# Patient Record
Sex: FEMALE | Race: WHITE | NOT HISPANIC OR LATINO | Employment: UNEMPLOYED | URBAN - METROPOLITAN AREA
[De-identification: names, ages, dates, MRNs, and addresses within clinical notes are randomized per-mention and may not be internally consistent; named-entity substitution may affect disease eponyms.]

---

## 2018-11-13 ENCOUNTER — OFFICE VISIT CONVERTED (OUTPATIENT)
Dept: ORTHOPEDIC SURGERY | Facility: CLINIC | Age: 18
End: 2018-11-13
Attending: ORTHOPAEDIC SURGERY

## 2018-12-11 ENCOUNTER — OFFICE VISIT CONVERTED (OUTPATIENT)
Dept: ORTHOPEDIC SURGERY | Facility: CLINIC | Age: 18
End: 2018-12-11
Attending: PHYSICIAN ASSISTANT

## 2019-01-03 ENCOUNTER — OFFICE VISIT CONVERTED (OUTPATIENT)
Dept: ORTHOPEDIC SURGERY | Facility: CLINIC | Age: 19
End: 2019-01-03
Attending: PHYSICIAN ASSISTANT

## 2019-02-05 ENCOUNTER — OFFICE VISIT CONVERTED (OUTPATIENT)
Dept: ORTHOPEDIC SURGERY | Facility: CLINIC | Age: 19
End: 2019-02-05
Attending: PHYSICIAN ASSISTANT

## 2020-02-17 ENCOUNTER — OFFICE VISIT CONVERTED (OUTPATIENT)
Dept: FAMILY MEDICINE CLINIC | Facility: CLINIC | Age: 20
End: 2020-02-17
Attending: NURSE PRACTITIONER

## 2020-02-17 ENCOUNTER — CONVERSION ENCOUNTER (OUTPATIENT)
Dept: FAMILY MEDICINE CLINIC | Facility: CLINIC | Age: 20
End: 2020-02-17

## 2020-03-13 ENCOUNTER — HOSPITAL ENCOUNTER (OUTPATIENT)
Dept: CARDIOLOGY | Facility: HOSPITAL | Age: 20
Discharge: HOME | End: 2020-03-13
Attending: NURSE PRACTITIONER

## 2020-03-16 ENCOUNTER — OFFICE VISIT CONVERTED (OUTPATIENT)
Dept: FAMILY MEDICINE CLINIC | Facility: CLINIC | Age: 20
End: 2020-03-16
Attending: NURSE PRACTITIONER

## 2020-05-01 ENCOUNTER — OFFICE VISIT CONVERTED (OUTPATIENT)
Dept: FAMILY MEDICINE CLINIC | Facility: CLINIC | Age: 20
End: 2020-05-01
Attending: PHYSICIAN ASSISTANT

## 2020-06-16 ENCOUNTER — OFFICE VISIT CONVERTED (OUTPATIENT)
Dept: FAMILY MEDICINE CLINIC | Facility: CLINIC | Age: 20
End: 2020-06-16
Attending: NURSE PRACTITIONER

## 2020-06-16 ENCOUNTER — CONVERSION ENCOUNTER (OUTPATIENT)
Dept: FAMILY MEDICINE CLINIC | Facility: CLINIC | Age: 20
End: 2020-06-16

## 2020-06-23 ENCOUNTER — HOSPITAL ENCOUNTER (OUTPATIENT)
Dept: LAB | Facility: HOSPITAL | Age: 20
Discharge: HOME | End: 2020-06-23
Attending: INTERNAL MEDICINE

## 2020-06-23 LAB
ANION GAP SERPL CALC-SCNC: 15 MMOL/L (ref 8–19)
BASOPHILS # BLD AUTO: 0.09 10*3/UL (ref 0–0.2)
BASOPHILS NFR BLD AUTO: 0.7 % (ref 0–3)
BUN SERPL-MCNC: 7 MG/DL (ref 5–25)
BUN/CREAT SERPL: 11 {RATIO} (ref 6–20)
CALCIUM SERPL-MCNC: 9.2 MG/DL (ref 8.7–10.4)
CHLORIDE SERPL-SCNC: 102 MMOL/L (ref 99–111)
CONV ABS IMM GRAN: 0.18 10*3/UL (ref 0–0.2)
CONV CO2: 25 MMOL/L (ref 22–32)
CONV IMMATURE GRAN: 1.5 % (ref 0–1.8)
CREAT UR-MCNC: 0.63 MG/DL (ref 0.5–0.9)
DEPRECATED RDW RBC AUTO: 43.6 FL (ref 36.4–46.3)
EOSINOPHIL # BLD AUTO: 0.13 10*3/UL (ref 0–0.7)
EOSINOPHIL # BLD AUTO: 1.1 % (ref 0–7)
ERYTHROCYTE [DISTWIDTH] IN BLOOD BY AUTOMATED COUNT: 13.2 % (ref 11.7–14.4)
GFR SERPLBLD BASED ON 1.73 SQ M-ARVRAT: >60 ML/MIN/{1.73_M2}
GLUCOSE SERPL-MCNC: 79 MG/DL (ref 65–99)
HCT VFR BLD AUTO: 48.4 % (ref 37–47)
HGB BLD-MCNC: 15.5 G/DL (ref 12–16)
LYMPHOCYTES # BLD AUTO: 2.59 10*3/UL (ref 1–5)
LYMPHOCYTES NFR BLD AUTO: 21.2 % (ref 20–45)
MCH RBC QN AUTO: 29 PG (ref 27–31)
MCHC RBC AUTO-ENTMCNC: 32 G/DL (ref 33–37)
MCV RBC AUTO: 90.5 FL (ref 81–99)
MONOCYTES # BLD AUTO: 0.78 10*3/UL (ref 0.2–1.2)
MONOCYTES NFR BLD AUTO: 6.4 % (ref 3–10)
NEUTROPHILS # BLD AUTO: 8.45 10*3/UL (ref 2–8)
NEUTROPHILS NFR BLD AUTO: 69.1 % (ref 30–85)
NRBC CBCN: 0 % (ref 0–0.7)
OSMOLALITY SERPL CALC.SUM OF ELEC: 283 MOSM/KG (ref 273–304)
PLATELET # BLD AUTO: 349 10*3/UL (ref 130–400)
PMV BLD AUTO: 9.7 FL (ref 9.4–12.3)
POTASSIUM SERPL-SCNC: 4.3 MMOL/L (ref 3.5–5.3)
RBC # BLD AUTO: 5.35 10*6/UL (ref 4.2–5.4)
SODIUM SERPL-SCNC: 138 MMOL/L (ref 135–147)
T4 FREE SERPL-MCNC: 1.3 NG/DL (ref 0.9–1.8)
TSH SERPL-ACNC: 3.27 M[IU]/L (ref 0.27–4.2)
WBC # BLD AUTO: 12.22 10*3/UL (ref 4.8–10.8)

## 2020-06-24 LAB — IGE SERPL-ACNC: 297 K[IU]/ML (ref 0–24)

## 2020-07-15 ENCOUNTER — OFFICE VISIT CONVERTED (OUTPATIENT)
Dept: PODIATRY | Facility: CLINIC | Age: 20
End: 2020-07-15
Attending: PODIATRIST

## 2020-07-29 ENCOUNTER — CONVERSION ENCOUNTER (OUTPATIENT)
Dept: PODIATRY | Facility: CLINIC | Age: 20
End: 2020-07-29

## 2020-07-29 ENCOUNTER — PROCEDURE VISIT CONVERTED (OUTPATIENT)
Dept: PODIATRY | Facility: CLINIC | Age: 20
End: 2020-07-29
Attending: PODIATRIST

## 2021-04-20 ENCOUNTER — OFFICE VISIT CONVERTED (OUTPATIENT)
Dept: FAMILY MEDICINE CLINIC | Facility: CLINIC | Age: 21
End: 2021-04-20
Attending: NURSE PRACTITIONER

## 2021-05-10 NOTE — H&P
History and Physical      Patient Name: Sylvia Oliva   Patient ID: [id number]   Sex: Female   YOB: 2000    Primary Care Provider: Christal AGUILAR   Referring Provider: Christal AGUILAR    Visit Date: July 15, 2020    Provider: Derrick Bird DPM   Location: [facility]   Location Address: [address] [place] [address]  [id number]   Location Phone: [phone]          Chief Complaint  · Left Foot Pain  · Plantar wart      History Of Present Illness  Sylvia Oliva presents to the office today for evaluation and treatment of      New, Established, New Problem: New  Location: Plantar left forefoot  Duration: 2000  Onset: Insidious  Nature: Sore  Stable, worsening, improving: Worsening  Aggravating factors:  Patient relates pain is aggravated by shoe gear and ambulation.    Previous Treatment: None  Patient denies any fevers, chills, nausea, vomiting, shortness of breathe, nor any other constitutional signs nor symptoms.    Referred from JEFF Moody.       Past Medical History  Asthma; Bone Density Screening; Depression; Plantar wart; Seasonal allergies         Past Surgical History  *No Past Surgical History         Medication List  albuterol sulfate 90 mcg/actuation inhalation HFA aerosol inhaler; Breo Ellipta 200-25 mcg/dose inhalation blister with device; Flonase Allergy Relief 50 mcg/actuation nasal spray,suspension; Prozac 10 mg oral capsule; Singulair 10 mg oral tablet; Spiriva Respimat 1.25 mcg/actuation inhalation mist; Zyrtec 10 mg oral tablet         Allergy List  NO KNOWN DRUG ALLERGIES       Allergies Reconciled  Family Medical History  Hypertension; Renal Cancer         Reproductive History   0 Para 0 0 0 0       Social History  Active but no formal exercise; Alcohol Use (Never); lives with other; lives with parents; No known infection risk; Recreational Drug Use (Never); Single.; Student.; Tobacco (Never); Working  "        Review of Systems  · Constitutional  o Denies  o : fatigue, night sweats  · Eyes  o Denies  o : double vision, blurred vision  · HENT  o Denies  o : vertigo, recent head injury  · Cardiovascular  o Denies  o : chest pain, irregular heart beats  · Respiratory  o Denies  o : shortness of breath, productive cough  · Gastrointestinal  o Denies  o : nausea, vomiting  · Genitourinary  o Denies  o : dysuria, urinary retention  · Integument  o * See HPI  · Neurologic  o Denies  o : altered mental status, seizures  · Musculoskeletal  o Denies  o : joint swelling, limitation of motion  · Endocrine  o Denies  o : cold intolerance, heat intolerance  · Heme-Lymph  o Denies  o : petechiae, lymph node enlargement or tenderness  · Allergic-Immunologic  o Denies  o : frequent illnesses      Vitals  Date Time BP Position Site L\R Cuff Size HR RR TEMP (F) WT  HT  BMI kg/m2 BSA m2 O2 Sat HC       07/15/2020 08:51 /62 Sitting    90 - R  97.5 250lbs 6oz 5'  5\" 41.66 2.28 99 %          Physical Examination  · Constitutional  o Appearance  o : awake, alert, well developed, well nourished, and well groomed  · Cardiovascular  o Peripheral Vascular System  o :   § Pedal Pulses  § : 2+ and symmetrical   · Musculoskeletal  o Extremeties/Joint  o : Lower extremity muscle strength and range of motion is equal and symmetrical bilaterally. The knees are noted to be in normal alignment. Ankle alignment and range of motion is normal and foot structure is normal. Subtalar, metatarsal and metatarsal-phalangeal joint range of motion is noted to be within normal limits. The digits of both feet are in normal alignment. The gait is normal.   · Skin and Subcutaneous Tissue  o Extremities  o :   § Left Lower Extremity  § : localized verruca on plantar left forefoot.  · Neurologic  o Sensation  o : Epicritic sensation intact bilaterally  · Procedures  o Destruct Verruca  o : This patient present for a destruction of verrucae lesion of the " plantar foot. I have recommended chemical destruction as therapy, of 1 lesions. Indications, risks and benefits and alternative treatments have been discussed with this patient who has agreed to this procedure. The lesion(s) was pared down and 89% Phenol was applied to the lesion area followed by irrigation with isopropyl alcohol.           Assessment  · Foot pain, left     729.5/M79.672  · Plantar wart     078.12/B07.0      Plan  · Orders  o Destruction of 1 to 14 benign lesions (43231) - - 07/15/2020  · Medications  o Medications have been Reconciled  o Transition of Care or Provider Policy  · Instructions  o Follow-up in 2 weeks  o I have discussed the findings of this evaluation with the patient. The discussion included a complete verbal explanation of any changes in the examination results, diagnosis, and the current treatment plan. A schedule for future care needs was explained. If any questions should arise after returning home, I have encouraged the patient to feel free to contact Dr. Bird. The patient states understanding and agreement with this plan.   o Pt to monitor for problems and to contact Dr. Bird for follow-up should such signs occur. Patient states understanding and agreement with this plan.   o Patient instructed to apply over the counter salicylic pads to the lesion(s) area(s) at bedtime and remove during the day. The patient states understanding and agreement with this plan.   o Electronically Identified Patient Education Materials Provided Electronically  · Disposition  o Call or Return if symptoms worsen or persist.            Electronically Signed by: Derrick Bird DPM -Author on July 15, 2020 09:10:49 AM

## 2021-05-13 NOTE — PROGRESS NOTES
Progress Note      Patient Name: Sylvia Oliva   Patient ID: [id number]   Sex: Female   YOB: 2000    Primary Care Provider: Christal AGUILAR   Referring Provider: Christal AGUILAR    Visit Date: June 16, 2020    Provider: JEFF Hernandez   Location: [facility]   Location Address: [address] [place] [address], [address]  [place], [place]  [id number]   Location Phone: [phone]          Chief Complaint  · Pt here for 3 month f/u   · medication refills  · FMLA      History Of Present Illness  Sylvia Oliva is a 20 year old /White female who presents for evaluation and treatment of:      Depression, anxiety, asthma, wart on left foot    Anxiety/ Depression-taking Prozac 10mg daily and doing well. Has been trying to be more compliant on taking it. Needs refill.     Asthma-Seen pulmonology and is now on Spiriva and breo inhalers and doing well. Rarely has to use rescue inhaler.     Left foot wart-c/o wart on left foot; becoming increasingly painful. pt has tried several OTC treatment remedies without relief.     Pt here also needing FMLA paperwork filled out for return to work. She started back 6/14.       Past Medical History  Disease Name Date Onset Notes   Asthma --  --    Bone Density Screening 12/2018 normal   Depression --  --    Seasonal allergies --  --          Past Surgical History  Procedure Name Date Notes   *No Past Surgical History --  --          Medication List  Name Date Started Instructions   albuterol sulfate 90 mcg/actuation inhalation HFA aerosol inhaler  inhale 1 - 2 puffs (90 - 180 mcg) by inhalation route every 4-6 hours as needed   Breo Ellipta 200-25 mcg/dose inhalation blister with device  inhale 1 puff by inhalation route once daily at the same time each day   Flonase Allergy Relief 50 mcg/actuation nasal spray,suspension  spray 1 - 2 sprays (50 - 100 mcg) in each nostril by intranasal route once daily as needed   Prozac 10 mg oral capsule 06/16/2020 take 1 capsule (10  mg) by oral route once daily for 90 days   Singulair 10 mg oral tablet 2020 take 1 tablet (10 mg) by oral route once daily in the evening for 30 days   Spiriva Respimat 1.25 mcg/actuation inhalation mist  inhale 2 puffs (2.5 mcg) by inhalation route once daily for 30 days   Zyrtec 10 mg oral tablet 2020 take 1 tablet (10 mg) by oral route once daily for 90 days         Allergy List  Allergen Name Date Reaction Notes   NO KNOWN DRUG ALLERGIES --  --  --        Allergies Reconciled  Family Medical History  Disease Name Relative/Age Notes   Hypertension Grandfather (maternal)/  Mother/   --    Renal Cancer Grandfather (maternal)/   --          Reproductive History  Menstrual   Age Menarche: 10   Pregnancy Summary   Total Pregnancies: 0 Full Term: 0 Premature: 0   Ab Induced: 0 Ab Spontaneous: 0 Ectopics: 0   Multiples: 0 Livin         Social History  Finding Status Start/Stop Quantity Notes   Active but no formal exercise --  --/-- --  --    Alcohol Use Never --/-- --  does not drink   lives with other --  --/-- --  --    lives with parents --  --/-- --  --    No known infection risk --  --/-- --  --    Recreational Drug Use Never --/-- --  no   Single. --  --/-- --  --    Student. --  --/-- --  --    Tobacco Never --/-- --  never smoker   Working --  --/-- --  --          Review of Systems  · Constitutional  o Denies  o : fatigue, night sweats  · Eyes  o Denies  o : double vision, blurred vision  · HENT  o Denies  o : vertigo, recent head injury  · Breasts  o Denies  o : abnormal changes in breast size, additional breast symptoms except as noted in the HPI  · Cardiovascular  o Denies  o : chest pain, irregular heart beats  · Respiratory  o Denies  o : shortness of breath, productive cough  · Gastrointestinal  o Denies  o : nausea, vomiting  · Genitourinary  o Denies  o : dysuria, urinary retention  · Integument  o Admits  o : wart on left foot  o Denies  o : hair growth change, new skin  "lesions  · Neurologic  o Denies  o : altered mental status, seizures  · Musculoskeletal  o Denies  o : joint swelling, limitation of motion  · Endocrine  o Denies  o : cold intolerance, heat intolerance  · Heme-Lymph  o Denies  o : petechiae, lymph node enlargement or tenderness  · Allergic-Immunologic  o Denies  o : frequent illnesses      Vitals  Date Time BP Position Site L\R Cuff Size HR RR TEMP (F) WT  HT  BMI kg/m2 BSA m2 O2 Sat HC       06/16/2020 01:02 /72 Sitting    87 - R  97.8 243lbs 16oz 5'  1\" 46.1 2.18 97 %          Physical Examination  · Constitutional  o Appearance  o : well developed, well-nourished, no acute distress  · Head and Face  o Head  o : normocephalic, atraumatic  · Neck  o Inspection/Palpation  o : normal appearance, no masses or tenderness, trachea midline  o Thyroid  o : gland size normal, nontender, no nodules or masses present on palpation  · Respiratory  o Respiratory Effort  o : breathing unlabored  o Inspection of Chest  o : chest rise symmetric bilaterally  o Auscultation of Lungs  o : clear to auscultation bilaterally throughout inspiration and expiration  · Cardiovascular  o Heart  o :   § Auscultation of Heart  § : regular rate and rhythm, no murmurs, gallops or rubs  o Peripheral Vascular System  o :   § Extremities  § : no edema  · Lymphatic  o Neck  o : no cervical lymphadenopathy, no supraclavicular lymphadenopathy  · Psychiatric  o Mood and Affect  o : mood normal, affect appropriate     integumentary-wart on plantar surface of left foot below third and fourth toes. TTP           Assessment  · Asthma     493.90/J45.909  · Depression     311/F32.9  · Plantar wart, left foot     078.12/B07.0    Problems Reconciled  Plan  · Orders  o ACO-39: Current medications updated and reviewed () - - 06/16/2020  o PODIATRY CONSULTATION (PODIA) - 078.12/B07.0 - 06/16/2020   dr holbrook-alannaht on left foot  · Medications  o Prozac 10 mg oral capsule   SIG: take 1 capsule (10 mg) " by oral route once daily for 90 days   DISP: (90) capsule with 1 refills  Prescribed on 06/16/2020     o Medications have been Reconciled  o Transition of Care or Provider Policy  · Instructions  o Patient was educated/instructed on their diagnosis, treatment and medications prior to discharge from the clinic today.  o Call the office with any concerns or questions.  o return to work paperwork completed  · Disposition  o Follow Up in 6 months            Electronically Signed by: JEFF Hernandez -Author on June 16, 2020 03:31:18 PM

## 2021-05-13 NOTE — PROGRESS NOTES
Progress Note      Patient Name: Sylvia Oliva   Patient ID: [id number]   Sex: Female   YOB: 2000    Primary Care Provider: Christal AGUILAR   Referring Provider: Christal AGUILAR    Visit Date: May 1, 2020    Provider: JALEESA Perez   Location: [facility]   Location Address: [address] [place] [address], [address]  [place], [place]  [id number]   Location Phone: [phone]          Chief Complaint  · wheezing  · cough       History Of Present Illness  Sylvia Oliva is a 20 year old /White female who presents for evaluation and treatment of:      Asthma: Patient states she was diagnosed with asthma in February and put on Symbicort. She was doing well and her symptoms were well controlled until about 4 days ago when she ran out of the Symbicort and she has had difficulty getting it refilled at the pharmacy and she has been without for 4 days. Patient states she has been wheezing with a productive cough all week & has been getting worse. Patient states she has had SOA, any activity makes it worse & she has been using albuterol inhaler every 4hrs. She does have DuoNeb at home but has not been using it. She does have allergies as well and is taking Zyrtec. She denies fever, chills, nausea, vomiting. She states minimal nasal congestion. Her O2 was rechecked upon sitting and was 99% on room air.       Past Medical History  Disease Name Date Onset Notes   Asthma --  --    Bone Density Screening 12/2018 normal   Depression --  --    Seasonal allergies --  --          Past Surgical History  Procedure Name Date Notes   *No Past Surgical History --  --          Medication List  Name Date Started Instructions   albuterol sulfate 90 mcg/actuation inhalation HFA aerosol inhaler  inhale 1 - 2 puffs (90 - 180 mcg) by inhalation route every 4-6 hours as needed   Prozac 10 mg oral capsule  take 1 capsule (10 mg) by oral route once daily   Symbicort 80-4.5 mcg/actuation inhalation HFA aerosol inhaler  "2020 INHALE TWO PUFFS BY MOUTH TWICE A DAY IN THE MORNING AND EVENING   Zyrtec 10 mg oral tablet 2020 take 1 tablet (10 mg) by oral route once daily for 90 days         Allergy List  Allergen Name Date Reaction Notes   NO KNOWN DRUG ALLERGIES --  --  --          Family Medical History  Disease Name Relative/Age Notes   Hypertension Grandfather (maternal)/  Mother/   --    Renal cancer Grandfather (maternal)/   --          Reproductive History  Menstrual   Age Menarche: 10   Pregnancy Summary   Total Pregnancies: 0 Full Term: 0 Premature: 0   Ab Induced: 0 Ab Spontaneous: 0 Ectopics: 0   Multiples: 0 Livin         Social History  Finding Status Start/Stop Quantity Notes   Active but no formal exercise --  --/-- --  --    Alcohol Use Never --/-- --  does not drink   lives with other --  --/-- --  --    lives with parents --  --/-- --  --    No known infection risk --  --/-- --  --    Recreational Drug Use Never --/-- --  no   Single. --  --/-- --  --    Student. --  --/-- --  --    Tobacco Never --/-- --  never smoker   Working --  --/-- --  --          Review of Systems  · Constitutional  o Denies  o : fever, fatigue, weight loss, weight gain  · HENT  o Admits  o : nasal congestion  o Denies  o : headaches, sinus pain, ear pain  · Cardiovascular  o Denies  o : lower extremity edema, claudication, chest pressure, palpitations  · Respiratory  o Admits  o : shortness of breath, wheezing, cough, dyspnea on exertion  o Denies  o : hemoptysis  · Gastrointestinal  o Denies  o : nausea, vomiting, diarrhea, constipation, abdominal pain  · Heme-Lymph  o Denies  o : petechiae, lymph node enlargement or tenderness      Vitals  Date Time BP Position Site L\R Cuff Size HR RR TEMP (F) WT  HT  BMI kg/m2 BSA m2 O2 Sat        2020 09:15 /68 Sitting    119 - R  97.7 241lbs 8oz 5'  5\" 40.19 2.24 95 %    2020 09:52 AM             99 %          Physical Examination  · Constitutional  o Appearance  o : " well developed, well-nourished, no acute distress  · Head and Face  o Head  o : normocephalic, atraumatic  · Ears, Nose, Mouth and Throat  o Ears  o :   § External Ears  § : external auditory canal appearance normal, no discharge present  § Otoscopic Examination  § : tympanic membranes pearly white/gray bilaterally  o Nose  o :   § External Nose  § : no lesions noted  § Nasopharynx  § : inflammation present   o Oral Cavity  o :   § Oral Mucosa  § : oral mucosa light pink  o Throat  o :   § Oropharynx  § : tonsils without exudate, no palatal petechiae  · Neck  o Inspection/Palpation  o : normal appearance, no masses or tenderness, trachea midline  o Thyroid  o : gland size normal, nontender, no nodules or masses present on palpation  · Respiratory  o Respiratory Effort  o : breathing unlabored  o Inspection of Chest  o : chest rise symmetric bilaterally  o Auscultation of Lungs  o : inspiratory and expiratory wheezing without crackles or rhonchi  · Cardiovascular  o Heart  o :   § Auscultation of Heart  § : regular rate and rhythm, no murmurs, gallops or rubs  o Peripheral Vascular System  o :   § Extremities  § : no edema  · Lymphatic  o Neck  o : no cervical lymphadenopathy, no supraclavicular lymphadenopathy  · Psychiatric  o Mood and Affect  o : mood normal, affect appropriate          Assessment  · Allergic rhinitis due to allergen     477.9/J30.9  Continue with Zyrtec 10mg qd  · Cough     786.2/R05  · Wheezing     786.07/R06.2  · Asthma exacerbation     493.92/J45.901  Gave Kenalog 40mg IM in office. Gave savings card to take to [facility] to attempt to get Symbicort refilled. Erx Singulair 10mg qd to start. Suggested pulse ox for home use. Use neb q 4-6 hrs as needed. Consider CXR and CBC if no improvement. [facility] if sx worsen.    Problems Reconciled  Plan  · Orders  o IM/SQ - Injection Fee [facility] (12534) - - 05/01/2020  o ACO-39: Current medications updated and  reviewed () - - 05/01/2020  o ACO-14: Influenza immunization administered or previously received () - - 05/01/2020  o 4.00 - Kenalog Injection 40mg (-9) - 493.92/J45.901 - 05/01/2020   Injection - Kenalog 40 mg; Dose: 40 mg; Site: Left Gluteus; Route: intramuscular; Date: 05/01/2020 10:21:19; Exp: 04/30/2021; Lot: [id number]; Mfg: [employer]; TradeName: triamcinolone; Location: [facility]; Administered By: Annika Patterson MA; Comment: 40mg/ml 1ml given DRM  · Medications  o Singulair 10 mg oral tablet   SIG: take 1 tablet (10 mg) by oral route once daily in the evening for 30 days   DISP: (30) tablets with 2 refills  Prescribed on 05/01/2020     o Medications have been Reconciled  o Transition of Care or Provider Policy  · Instructions  o Take all medications as prescribed/directed.  o Patient was educated/instructed on their diagnosis, treatment and medications prior to discharge from the clinic today.  o Patient instructed to seek medical attention urgently for new or worsening symptoms.  o Call the office with any concerns or questions.  o Bring all medicines with their bottles to each office visit.  o Electronically Identified Patient Education Materials Provided Electronically  · Disposition  o Call or Return if symptoms worsen or persist.  o Follow Up PRN.            Electronically Signed by: JALEESA Perez -Author on May 1, 2020 01:47:22 PM

## 2021-05-13 NOTE — PROGRESS NOTES
Progress Note      Patient Name: Sylvia Oliva   Patient ID: [id number]   Sex: Female   YOB: 2000    Primary Care Provider: Christal AGUILAR   Referring Provider: Christal AGUILAR    Visit Date: 2020    Provider: Derrick Bird DPM   Location: [facility]   Location Address: [address] [place] [address]  [id number]   Location Phone: [phone]          Chief Complaint  · Left Foot Pain  · Plantar wart      History Of Present Illness  Sylvia Oliva presents to the office today for evaluation and treatment of      New, Established, New Problem: est  Location: Plantar left forefoot  Duration: 2000  Onset: Insidious  Nature: Sore  Stable, worsening, improving: improving  Aggravating factors:  Patient relates pain is aggravated by shoe gear and ambulation.    Previous Treatment: debridement, OTC sal-acid application    Patient denies any fevers, chills, nausea, vomiting, shortness of breathe, nor any other constitutional signs nor symptoms.         Past Medical History  Asthma; Bone Density Screening; Depression; Plantar wart; Seasonal allergies         Past Surgical History  *No Past Surgical History         Medication List  albuterol sulfate 90 mcg/actuation inhalation HFA aerosol inhaler; Breo Ellipta 200-25 mcg/dose inhalation blister with device; Flonase Allergy Relief 50 mcg/actuation nasal spray,suspension; Prozac 10 mg oral capsule; Singulair 10 mg oral tablet; Spiriva Respimat 1.25 mcg/actuation inhalation mist; Zyrtec 10 mg oral tablet         Allergy List  NO KNOWN DRUG ALLERGIES       Allergies Reconciled  Family Medical History  Hypertension; Renal Cancer         Reproductive History   0 Para 0 0 0 0       Social History  Active but no formal exercise; Alcohol Use (Never); lives with other; lives with parents; No known infection risk; Recreational Drug Use (Never); Single.; Student.; Tobacco (Never); Working         Review of  "Systems  · Constitutional  o Denies  o : fatigue, night sweats  · Eyes  o Denies  o : double vision, blurred vision  · HENT  o Denies  o : vertigo, recent head injury  · Cardiovascular  o Denies  o : chest pain, irregular heart beats  · Respiratory  o Denies  o : shortness of breath, productive cough  · Gastrointestinal  o Denies  o : nausea, vomiting  · Genitourinary  o Denies  o : dysuria, urinary retention  · Integument  o * See HPI  · Neurologic  o Denies  o : altered mental status, seizures  · Musculoskeletal  o Denies  o : joint swelling, limitation of motion  · Endocrine  o Denies  o : cold intolerance, heat intolerance  · Heme-Lymph  o Denies  o : petechiae, lymph node enlargement or tenderness  · Allergic-Immunologic  o Denies  o : frequent illnesses      Vitals  Date Time BP Position Site L\R Cuff Size HR RR TEMP (F) WT  HT  BMI kg/m2 BSA m2 O2 Sat HC       07/29/2020 11:23 /72 Sitting    91 - R  97.8 250lbs 2oz 5'  5\" 41.62 2.28 96 %          Physical Examination  · Constitutional  o Appearance  o : awake, alert, well developed, well nourished, and well groomed  · Cardiovascular  o Peripheral Vascular System  o :   § Pedal Pulses  § : 2+ and symmetrical   · Musculoskeletal  o Extremeties/Joint  o : Lower extremity muscle strength and range of motion is equal and symmetrical bilaterally. The knees are noted to be in normal alignment. Ankle alignment and range of motion is normal and foot structure is normal. Subtalar, metatarsal and metatarsal-phalangeal joint range of motion is noted to be within normal limits. The digits of both feet are in normal alignment. The gait is normal.   · Skin and Subcutaneous Tissue  o Extremities  o :   § Left Lower Extremity  § : localized verruca on plantar left forefoot.  · Neurologic  o Sensation  o : Epicritic sensation intact bilaterally  · Procedures  o Destruct Verruca  o : This patient present for a destruction of verrucae lesion of the plantar foot. I have " recommended chemical destruction as therapy, of 1 lesions. Indications, risks and benefits and alternative treatments have been discussed with this patient who has agreed to this procedure. The lesion(s) was pared down and 89% Phenol was applied to the lesion area followed by irrigation with isopropyl alcohol.           Assessment  · Foot pain, left     729.5/M79.672  · Plantar wart     078.12/B07.0      Plan  · Orders  o Destruction of 1 to 14 benign lesions (05704, 32595) - - 07/29/2020  · Medications  o Medications have been Reconciled  o Transition of Care or Provider Policy  · Instructions  o Follow Up as Needed  o I have discussed the findings of this evaluation with the patient. The discussion included a complete verbal explanation of any changes in the examination results, diagnosis, and the current treatment plan. A schedule for future care needs was explained. If any questions should arise after returning home, I have encouraged the patient to feel free to contact Dr. Bird. The patient states understanding and agreement with this plan.   o Pt to monitor for problems and to contact Dr. Bird for follow-up should such signs occur. Patient states understanding and agreement with this plan.   o Electronically Identified Patient Education Materials Provided Electronically  · Disposition  o Call or Return if symptoms worsen or persist.            Electronically Signed by: Derrick Bird DPM -Author on July 29, 2020 11:56:36 AM

## 2021-05-14 VITALS
TEMPERATURE: 99.5 F | HEART RATE: 82 BPM | WEIGHT: 256 LBS | OXYGEN SATURATION: 99 % | SYSTOLIC BLOOD PRESSURE: 121 MMHG | DIASTOLIC BLOOD PRESSURE: 59 MMHG | BODY MASS INDEX: 42.65 KG/M2 | HEIGHT: 65 IN

## 2021-05-14 NOTE — PROGRESS NOTES
Progress Note      Patient Name: Sylvia Oliva   Patient ID: [id number]   Sex: Female   YOB: 2000    Primary Care Provider: Christal AGUILAR   Referring Provider: Christal AGUILAR    Visit Date: April 20, 2021    Provider: JEFF Hernandez   Location: [facility]   Location Address: [address] [place] [address], [address]  [place], [place]  [id number]   Location Phone: [phone]          Chief Complaint  · Follow up - Anxiety/Depression, Asthma  · Eval for ADHD  · Hypersomnia      History Of Present Illness  Sylvia Oliva is a 20 year old /White female who presents for evaluation and treatment of:      Follow up on Anxiety/Depression, Asthma for medication refills.    Anxiety/Depression:  Takes Prozac.  Patient scored 15 on today's PHQ9.  Patient states she thinks medication is working well for her.  Patient states it is hard to tell d/t she thinks she has ADHD or ADD.  Patient states she has executive disfunction.  She states she knows she has to do things and knows how to these things but cannot physically force herself to do it.  Patient states she is having difficulty focusing, difficulty starting something then once she does she can't stop doing that thing. Pt states she is generally happy, but feels guilt bc she does not always get her schoolwork done or projects started. pt does not think this is depression symptoms, she feels as though this is due to trouble focusing and inattention. She does admit to not taking the Prozac regularly and she does miss doses.     Asthma:  Takes Breo Ellipta, Flonase, Spriiva, Zyrtec, Albuterol HFA PRN with good control of symptoms.  Patient is managed per Dr. Foss, Pulmonology. Pt admits to using the Albuterol inhaler prior to when she has to walk to class uphill.     Patient requesting stimulant Rx for Hypersomnia.  Patient states she previously took Modafinil, but is requesting something else d/t Rx made her feel physically ill.   Patient states her last sleep study was 2016.  Patient states she was diagnosed with borderline Narcolepsy.    Patient has had her first Pfizer Covid 19 vaccination. Pt gets her second vaccine May 1.       Past Medical History  Disease Name Date Onset Notes   Asthma --  --    Bone Density Screening 12/2018 normal   Depression --  --    Plantar wart --  --    Seasonal allergies --  --          Past Surgical History  Procedure Name Date Notes   *No Past Surgical History --  --          Medication List  Name Date Started Instructions   albuterol sulfate 90 mcg/actuation inhalation HFA aerosol inhaler  inhale 1 - 2 puffs (90 - 180 mcg) by inhalation route every 4-6 hours as needed   Breo Ellipta 200-25 mcg/dose inhalation blister with device  inhale 1 puff by inhalation route once daily at the same time each day   Flonase Allergy Relief 50 mcg/actuation nasal spray,suspension  spray 1 - 2 sprays (50 - 100 mcg) in each nostril by intranasal route once daily as needed   Prozac 10 mg oral capsule 04/20/2021 take 1 capsule (10 mg) by oral route once daily for 90 days   Singulair 10 mg oral tablet 05/01/2020 take 1 tablet (10 mg) by oral route once daily in the evening for 30 days   Spiriva Respimat 1.25 mcg/actuation inhalation mist  inhale 2 puffs (2.5 mcg) by inhalation route once daily for 30 days   Vitamin D3 10 mcg (400 unit) oral tablet  take 1 tablet by oral route daily   Zyrtec 10 mg oral tablet 03/16/2020 take 1 tablet (10 mg) by oral route once daily for 90 days         Allergy List  Allergen Name Date Reaction Notes   NO KNOWN DRUG ALLERGIES --  --  --        Allergies Reconciled  Family Medical History  Disease Name Relative/Age Notes   Hypertension Grandfather (maternal)/  Mother/   --    Renal Cancer Grandfather (maternal)/   --          Reproductive History  Menstrual   Age Menarche: 10   Pregnancy Summary   Total Pregnancies: 0 Full Term: 0 Premature: 0   Ab Induced: 0 Ab Spontaneous: 0 Ectopics: 0  "  Multiples: 0 Livin         Social History  Finding Status Start/Stop Quantity Notes   Active but no formal exercise --  --/-- --  --    Alcohol Use Never --/-- --  does not drink   lives with other --  --/-- --  --    lives with parents --  --/-- --  --    No known infection risk --  --/-- --  --    Recreational Drug Use Never --/-- --  no   Single. --  --/-- --  --    Student. --  --/-- --  --    Tobacco Never --/-- --  --    Working --  --/-- --  --          Immunizations  NameDate Admin Mfg Trade Name Lot Number Route Inj VIS Given VIS Publication   COVID Mihsli40/10/2021 NE Not Entered  NE NE 2021    Comments:    Gzkwttznx86/10/2020 [employer] Fluzone Quadrivalent  NE NE 2021    Comments:          Review of Systems  · Constitutional  o Admits  o : inattentiveness  o Denies  o : fatigue  · Eyes  o Denies  o : blurred vision, changes in vision  · HENT  o Denies  o : headaches  · Cardiovascular  o Denies  o : chest pain, irregular heart beats, rapid heart rate, dyspnea on exertion  · Respiratory  o Denies  o : shortness of breath, wheezing, cough  · Gastrointestinal  o Denies  o : nausea, vomiting, diarrhea, constipation, abdominal pain, blood in stools, melena  · Genitourinary  o Denies  o : frequency, dysuria, hematuria  · Integument  o Denies  o : rash, new skin lesions  · Musculoskeletal  o Denies  o : joint pain, joint swelling, muscle pain  · Endocrine  o Denies  o : polyuria, polydipsia      Vitals  Date Time BP Position Site L\R Cuff Size HR RR TEMP (F) WT  HT  BMI kg/m2 BSA m2 O2 Sat FR L/min FiO2 HC       07/15/2020 08:51 /62 Sitting    90 - R  97.5 250lbs 6oz 5'  5\" 41.66 2.28 99 %      2020 11:23 /72 Sitting    91 - R  97.8 250lbs 2oz 5'  5\" 41.62 2.28 96 %      2021 01:16 /59 Sitting    82 - R  99.5 256lbs 0oz 5'  5\" 42.6 2.31 99 %  21%          Physical Examination  · Constitutional  o Appearance  o : well developed, well-nourished, no acute distress  · Head " and Face  o Head  o : normocephalic, atraumatic  · Neck  o Inspection/Palpation  o : normal appearance, no masses or tenderness, trachea midline  o Thyroid  o : gland size normal, nontender, no nodules or masses present on palpation  · Respiratory  o Respiratory Effort  o : breathing unlabored  o Inspection of Chest  o : chest rise symmetric bilaterally  o Auscultation of Lungs  o : clear to auscultation bilaterally throughout inspiration and expiration  · Cardiovascular  o Heart  o :   § Auscultation of Heart  § : regular rate and rhythm, no murmurs, gallops or rubs  o Peripheral Vascular System  o :   § Extremities  § : no edema  · Psychiatric  o Mood and Affect  o : mood normal, affect appropriate          Assessment  · Screening for depression     V79.0/Z13.89  · Anxiety disorder     300.00/F41.9  · Asthma     493.90/J45.909  · Depression     311/F32.9  · Hypersomnia     780.54/G47.10  · Routine lab draw     V72.60/Z01.89  · Decreased attention Span     799.51/R41.840  pt desires formal evaluation for ADD, will refer to psych provider, pt prefers someone in [facility] if possible    Problems Reconciled  Plan  · Orders  o Annual depression screening, 15 minutes (77206, ) - V79.0/Z13.89 - 04/20/2021  o ACO-18: Positive screen for clinical depression using a standardized tool and a follow-up plan documented () - V79.0/Z13.89 - 04/20/2021   15  o Physical, Primary Care Panel (CBC, CMP, Lipid, TSH) [facility] (40357, 96913, 17578, 77630) - V72.60/Z01.89 - 04/20/2021  o ACO-18: Positive screen for clinical depression using a standardized tool and a follow-up plan documented () - - 04/20/2021  o ACO-14: Influenza immunization administered or previously received [facility] () - - 04/20/2021  o ACO-39: Current medications updated and reviewed (1159F, ) - - 04/20/2021  o PSYCHIATRY CONSULTATION (PSYCH) - 799.51/R41.840 - 04/20/2021  · Medications  o Prozac 10 mg oral capsule   SIG: take 1 capsule (10  mg) by oral route once daily for 90 days   DISP: (90) Capsule with 1 refills  Refilled on 04/20/2021     o Medications have been Reconciled  o Transition of Care or Provider Policy  · Instructions  o Depression Screen completed and scanned into the EMR under the designated folder within the patient's documents.  o Today's PHQ-9 result is _15__will continue Prozac currently, will refer to psych for formal ADD evaluation. pt prefers someone in [facility].  o Patient was educated/instructed on their diagnosis, treatment and medications prior to discharge from the clinic today.  o Call the office with any concerns or questions.  o Electronically Identified Patient Education Materials Provided Electronically  · Disposition  o Follow Up in 6 months            Electronically Signed by: JEFF Hernandez -Author on April 20, 2021 03:37:54 PM

## 2021-05-15 VITALS
BODY MASS INDEX: 46.07 KG/M2 | SYSTOLIC BLOOD PRESSURE: 116 MMHG | HEART RATE: 87 BPM | OXYGEN SATURATION: 97 % | WEIGHT: 244 LBS | HEIGHT: 61 IN | DIASTOLIC BLOOD PRESSURE: 72 MMHG | TEMPERATURE: 97.8 F

## 2021-05-15 VITALS — OXYGEN SATURATION: 99 % | BODY MASS INDEX: 39.82 KG/M2 | WEIGHT: 239 LBS | HEART RATE: 78 BPM | HEIGHT: 65 IN

## 2021-05-15 VITALS
HEIGHT: 65 IN | TEMPERATURE: 97.8 F | SYSTOLIC BLOOD PRESSURE: 137 MMHG | DIASTOLIC BLOOD PRESSURE: 72 MMHG | HEART RATE: 91 BPM | WEIGHT: 250.12 LBS | OXYGEN SATURATION: 96 % | BODY MASS INDEX: 41.67 KG/M2

## 2021-05-15 VITALS
BODY MASS INDEX: 41.72 KG/M2 | DIASTOLIC BLOOD PRESSURE: 62 MMHG | SYSTOLIC BLOOD PRESSURE: 134 MMHG | OXYGEN SATURATION: 99 % | HEIGHT: 65 IN | TEMPERATURE: 97.5 F | WEIGHT: 250.37 LBS | HEART RATE: 90 BPM

## 2021-05-15 VITALS
OXYGEN SATURATION: 98 % | BODY MASS INDEX: 40.82 KG/M2 | HEART RATE: 84 BPM | DIASTOLIC BLOOD PRESSURE: 65 MMHG | WEIGHT: 245 LBS | HEIGHT: 65 IN | SYSTOLIC BLOOD PRESSURE: 126 MMHG

## 2021-05-15 VITALS
TEMPERATURE: 97.7 F | HEIGHT: 65 IN | BODY MASS INDEX: 40.24 KG/M2 | OXYGEN SATURATION: 95 % | WEIGHT: 241.5 LBS | SYSTOLIC BLOOD PRESSURE: 150 MMHG | DIASTOLIC BLOOD PRESSURE: 68 MMHG | HEART RATE: 119 BPM

## 2021-05-15 VITALS — BODY MASS INDEX: 39.32 KG/M2 | HEART RATE: 65 BPM | OXYGEN SATURATION: 98 % | HEIGHT: 65 IN | WEIGHT: 236 LBS

## 2021-05-15 VITALS — HEART RATE: 72 BPM | HEIGHT: 65 IN | WEIGHT: 237 LBS | OXYGEN SATURATION: 99 % | BODY MASS INDEX: 39.49 KG/M2

## 2021-05-15 VITALS
OXYGEN SATURATION: 95 % | HEART RATE: 98 BPM | BODY MASS INDEX: 39.99 KG/M2 | HEIGHT: 65 IN | DIASTOLIC BLOOD PRESSURE: 75 MMHG | WEIGHT: 240 LBS | SYSTOLIC BLOOD PRESSURE: 132 MMHG

## 2021-05-16 VITALS — WEIGHT: 237 LBS | OXYGEN SATURATION: 98 % | HEART RATE: 91 BPM | HEIGHT: 65 IN | BODY MASS INDEX: 39.49 KG/M2

## 2021-09-24 ENCOUNTER — TELEPHONE (OUTPATIENT)
Dept: UROLOGY | Facility: CLINIC | Age: 21
End: 2021-09-24

## 2021-09-24 NOTE — TELEPHONE ENCOUNTER
[facility] ER in [place] on 09/23.  Stated she had CT done and has 1 cm stone, and was told she will probably have to have surgery.    No pain now, but medicated.  Nausea and vomiting, but not today.  No blood in urin or issue passing urine.      Asking to be seen asap.  Aware she needs disk.    Dr. Orona on-call yesterday.

## 2021-09-30 ENCOUNTER — OFFICE VISIT (OUTPATIENT)
Dept: UROLOGY | Facility: CLINIC | Age: 21
End: 2021-09-30

## 2021-09-30 ENCOUNTER — PREP FOR SURGERY (OUTPATIENT)
Dept: OTHER | Facility: HOSPITAL | Age: 21
End: 2021-09-30

## 2021-09-30 VITALS — BODY MASS INDEX: 40.98 KG/M2 | WEIGHT: 246 LBS | HEIGHT: 65 IN

## 2021-09-30 DIAGNOSIS — N20.0 NEPHROLITHIASIS: ICD-10-CM

## 2021-09-30 DIAGNOSIS — N20.1 RIGHT URETERAL STONE: Primary | ICD-10-CM

## 2021-09-30 PROCEDURE — 99203 OFFICE O/P NEW LOW 30 MIN: CPT | Performed by: UROLOGY

## 2021-09-30 RX ORDER — FLUOXETINE 10 MG/1
10 CAPSULE ORAL NIGHTLY
COMMUNITY
End: 2023-02-13 | Stop reason: SDUPTHER

## 2021-09-30 RX ORDER — CEFAZOLIN SODIUM IN 0.9 % NACL 3 G/100 ML
3 INTRAVENOUS SOLUTION, PIGGYBACK (ML) INTRAVENOUS ONCE
Status: CANCELLED | OUTPATIENT
Start: 2021-09-30 | End: 2021-09-30

## 2021-09-30 RX ORDER — TIOTROPIUM BROMIDE INHALATION SPRAY 1.56 UG/1
SPRAY, METERED RESPIRATORY (INHALATION)
COMMUNITY
End: 2022-06-07 | Stop reason: ALTCHOICE

## 2021-09-30 RX ORDER — FLUTICASONE PROPIONATE 50 MCG
SPRAY, SUSPENSION (ML) NASAL
COMMUNITY
End: 2023-02-13 | Stop reason: ALTCHOICE

## 2021-09-30 RX ORDER — AZELASTINE 1 MG/ML
2 SPRAY, METERED NASAL 2 TIMES DAILY
COMMUNITY

## 2021-09-30 RX ORDER — ALBUTEROL SULFATE 90 UG/1
AEROSOL, METERED RESPIRATORY (INHALATION)
COMMUNITY
End: 2023-02-13 | Stop reason: SDUPTHER

## 2021-09-30 RX ORDER — MONTELUKAST SODIUM 10 MG/1
10 TABLET ORAL NIGHTLY
COMMUNITY
End: 2023-02-13 | Stop reason: SDUPTHER

## 2021-09-30 NOTE — PROGRESS NOTES
UROLOGY OFFICE H&P NOTE    Subjective   HPI  Sylvia Oliva is a 21 y.o. female presents for further evaluation of ureteral stone after presentation to the ER for severe, acute, stabbing right sided flank pain which radiated to back and abdomen. Patient states that pain was exacerbated by nothing.  Also notes associated nausea; denies any fever.  CT performed in the ER, 9/23/2021 at [facility], indicated a right ureteral calculi about 10 mm at [facility]J.  No additional ureteral calculi were identified; no renal calculi.    Symptoms were controlled and patient was discharged home for continued trial of passage.     Since discharge from ER, there have no further pain episodes. No fever or nausea.  Has not passed stones.   Not currently requiring any medications for stone symptoms.  Denies history of nephrolithiasis.    Labs, 9/23/2021: Creatinine 0.7; WBC 13.3; UA large blood, nitrate negative, leukocyte esterase negative    Family history of nephrolithiasis     No results found for this or any previous visit.      Medical History:  Past Medical History:   Diagnosis Date   • Asthma    • Depression    • Plantar wart    • Seasonal allergies         Social History:  Social History     Socioeconomic History   • Marital status: Single     Spouse name: Not on file   • Number of children: Not on file   • Years of education: Not on file   • Highest education level: Not on file   Tobacco Use   • Smoking status: Never Smoker   • Smokeless tobacco: Never Used   Substance and Sexual Activity   • Alcohol use: Never   • Drug use: Never        Family History:  Family History   Problem Relation Age of Onset   • Hypertension Mother    • Hypertension Maternal Grandfather    • Kidney cancer Maternal Grandfather         Surgical History:  History reviewed. No pertinent surgical history.     Allergies:  No Known Allergies     Current Medications:  Current Outpatient Medications   Medication Sig Dispense Refill   •  "azelastine (ASTELIN) 0.1 % nasal spray 2 sprays into the nostril(s) as directed by provider 2 (Two) Times a Day. Use in each nostril as directed     • albuterol sulfate  (90 Base) MCG/ACT inhaler albuterol sulfate 90 mcg/actuation inhalation HFA aerosol inhaler inhale 1 - 2 puffs (90 - 180 mcg) by inhalation route every 4-6 hours as needed   Active     • FLUoxetine (PROzac) 10 MG capsule Take 10 mg by mouth Daily.     • fluticasone (Flonase) 50 MCG/ACT nasal spray Flonase Allergy Relief 50 mcg/actuation nasal spray,suspension spray 1 - 2 sprays (50 - 100 mcg) in each nostril by intranasal route once daily as needed   Active     • Fluticasone Furoate-Vilanterol (Breo Ellipta) 200-25 MCG/INH inhaler Breo Ellipta 200-25 mcg/dose inhalation blister with device inhale 1 puff by inhalation route once daily at the same time each day   Active     • Tiotropium Bromide Monohydrate (Spiriva Respimat) 1.25 MCG/ACT aerosol solution inhaler Spiriva Respimat 1.25 mcg/actuation inhalation mist inhale 2 puffs (2.5 mcg) by inhalation route once daily for 30 days   Active       No current facility-administered medications for this visit.       Review of systems  Constitutional: Denies fever chills  GI: Denies nausea, vomiting    Objective     Vital Signs:   Ht 165.1 cm (65\")   Wt 112 kg (246 lb)   BMI 40.94 kg/m²       Physical exam  No acute distress, well-nourished, obese  Lungs: Clear, unlabored  CV: Regular rate, no chest retractions  Awake alert and oriented  Mood normal; affect normal    Problem List:  Patient Active Problem List   Diagnosis   • Right ureteral stone       Assessment/Plan   Diagnoses and all orders for this visit:    1. Right ureteral stone (Primary)    Right ureteral calculi-approximately 10 mm in size.  CT in ER records reviewed and discussed with patient. Given size of stone and location, patient unlikely to pass without surgical intervention. Agreeable to proceed with definitive surgical management " of stone at this time.     Plan for cystoscopy, right retrograde pyelogram, ureteroscopy, laser lithotripsy, stent placement.  Risks, benefits and alternatives were discussed with patient including bleeding, infection, damage to surrounding structures, stone recurrence, stricture.  Patient also notes understanding that if unable to reach the level of the stone or if significant purulent discharge noted upon initiation of procedure that stent will be placed in definitive stone management will be deferred until the collecting system is optimized.     Schedule OR   all question addressed at this time.           Signed:  Amanda Orona MD  09/30/21  09:53 EDT    MDM moderate: 1 chronic illness with exacerbation, progression; decision regarding surgery with identified patient or procedure risk factors

## 2021-10-04 ENCOUNTER — ANESTHESIA (OUTPATIENT)
Dept: PERIOP | Facility: HOSPITAL | Age: 21
End: 2021-10-04

## 2021-10-04 ENCOUNTER — ANESTHESIA EVENT (OUTPATIENT)
Dept: PERIOP | Facility: HOSPITAL | Age: 21
End: 2021-10-04

## 2021-10-04 ENCOUNTER — HOSPITAL ENCOUNTER (OUTPATIENT)
Facility: HOSPITAL | Age: 21
Setting detail: HOSPITAL OUTPATIENT SURGERY
Discharge: HOME | End: 2021-10-04
Attending: UROLOGY | Admitting: UROLOGY

## 2021-10-04 ENCOUNTER — APPOINTMENT (OUTPATIENT)
Dept: GENERAL RADIOLOGY | Facility: HOSPITAL | Age: 21
End: 2021-10-04

## 2021-10-04 VITALS
HEIGHT: 65 IN | WEIGHT: 246.69 LBS | HEART RATE: 63 BPM | OXYGEN SATURATION: 98 % | TEMPERATURE: 98.1 F | BODY MASS INDEX: 41.1 KG/M2 | RESPIRATION RATE: 20 BRPM | DIASTOLIC BLOOD PRESSURE: 74 MMHG | SYSTOLIC BLOOD PRESSURE: 133 MMHG

## 2021-10-04 DIAGNOSIS — N20.1 RIGHT URETERAL STONE: ICD-10-CM

## 2021-10-04 LAB — B-HCG UR QL: NEGATIVE

## 2021-10-04 PROCEDURE — 25010000002 CEFAZOLIN PER 500 MG: Performed by: UROLOGY

## 2021-10-04 PROCEDURE — C1769 GUIDE WIRE: HCPCS | Performed by: UROLOGY

## 2021-10-04 PROCEDURE — 81025 URINE PREGNANCY TEST: CPT | Performed by: STUDENT IN AN ORGANIZED HEALTH CARE EDUCATION/TRAINING PROGRAM

## 2021-10-04 PROCEDURE — C1758 CATHETER, URETERAL: HCPCS | Performed by: UROLOGY

## 2021-10-04 PROCEDURE — 25010000002 FENTANYL CITRATE (PF) 50 MCG/ML SOLUTION: Performed by: NURSE ANESTHETIST, CERTIFIED REGISTERED

## 2021-10-04 PROCEDURE — 0 IOPAMIDOL PER 1 ML: Performed by: UROLOGY

## 2021-10-04 PROCEDURE — 25010000002 ONDANSETRON PER 1 MG: Performed by: NURSE ANESTHETIST, CERTIFIED REGISTERED

## 2021-10-04 PROCEDURE — C2617 STENT, NON-COR, TEM W/O DEL: HCPCS | Performed by: UROLOGY

## 2021-10-04 PROCEDURE — 25010000002 MIDAZOLAM PER 1MG: Performed by: STUDENT IN AN ORGANIZED HEALTH CARE EDUCATION/TRAINING PROGRAM

## 2021-10-04 PROCEDURE — 82365 CALCULUS SPECTROSCOPY: CPT | Performed by: UROLOGY

## 2021-10-04 PROCEDURE — 25010000002 DEXAMETHASONE PER 1 MG: Performed by: NURSE ANESTHETIST, CERTIFIED REGISTERED

## 2021-10-04 PROCEDURE — 88300 SURGICAL PATH GROSS: CPT | Performed by: UROLOGY

## 2021-10-04 PROCEDURE — 25010000002 PROPOFOL 10 MG/ML EMULSION: Performed by: NURSE ANESTHETIST, CERTIFIED REGISTERED

## 2021-10-04 PROCEDURE — C1894 INTRO/SHEATH, NON-LASER: HCPCS | Performed by: UROLOGY

## 2021-10-04 PROCEDURE — 74420 UROGRAPHY RTRGR +-KUB: CPT

## 2021-10-04 PROCEDURE — 52356 CYSTO/URETERO W/LITHOTRIPSY: CPT | Performed by: UROLOGY

## 2021-10-04 DEVICE — STNT URETRL CLASSC DBL PIG 6F 24CM: Type: IMPLANTABLE DEVICE | Site: URETER | Status: FUNCTIONAL

## 2021-10-04 RX ORDER — SODIUM CHLORIDE, SODIUM LACTATE, POTASSIUM CHLORIDE, CALCIUM CHLORIDE 600; 310; 30; 20 MG/100ML; MG/100ML; MG/100ML; MG/100ML
9 INJECTION, SOLUTION INTRAVENOUS CONTINUOUS PRN
Status: DISCONTINUED | OUTPATIENT
Start: 2021-10-04 | End: 2021-10-04 | Stop reason: HOSPADM

## 2021-10-04 RX ORDER — ACETAMINOPHEN 500 MG
1000 TABLET ORAL ONCE
Status: COMPLETED | OUTPATIENT
Start: 2021-10-04 | End: 2021-10-04

## 2021-10-04 RX ORDER — SCOLOPAMINE TRANSDERMAL SYSTEM 1 MG/1
1 PATCH, EXTENDED RELEASE TRANSDERMAL CONTINUOUS
Status: DISCONTINUED | OUTPATIENT
Start: 2021-10-04 | End: 2021-10-04 | Stop reason: HOSPADM

## 2021-10-04 RX ORDER — ONDANSETRON 2 MG/ML
INJECTION INTRAMUSCULAR; INTRAVENOUS AS NEEDED
Status: DISCONTINUED | OUTPATIENT
Start: 2021-10-04 | End: 2021-10-04 | Stop reason: SURG

## 2021-10-04 RX ORDER — ACETAMINOPHEN 325 MG/1
650 TABLET ORAL ONCE
Status: DISCONTINUED | OUTPATIENT
Start: 2021-10-04 | End: 2021-10-04 | Stop reason: HOSPADM

## 2021-10-04 RX ORDER — PROMETHAZINE HYDROCHLORIDE 12.5 MG/1
12.5 TABLET ORAL ONCE AS NEEDED
Status: DISCONTINUED | OUTPATIENT
Start: 2021-10-04 | End: 2021-10-04 | Stop reason: HOSPADM

## 2021-10-04 RX ORDER — PROPOFOL 10 MG/ML
VIAL (ML) INTRAVENOUS AS NEEDED
Status: DISCONTINUED | OUTPATIENT
Start: 2021-10-04 | End: 2021-10-04 | Stop reason: SURG

## 2021-10-04 RX ORDER — IBUPROFEN 600 MG/1
600 TABLET ORAL EVERY 6 HOURS PRN
Status: DISCONTINUED | OUTPATIENT
Start: 2021-10-04 | End: 2021-10-04 | Stop reason: HOSPADM

## 2021-10-04 RX ORDER — FENTANYL CITRATE 50 UG/ML
INJECTION, SOLUTION INTRAMUSCULAR; INTRAVENOUS AS NEEDED
Status: DISCONTINUED | OUTPATIENT
Start: 2021-10-04 | End: 2021-10-04 | Stop reason: SURG

## 2021-10-04 RX ORDER — MIDAZOLAM HYDROCHLORIDE 2 MG/2ML
2 INJECTION, SOLUTION INTRAMUSCULAR; INTRAVENOUS ONCE
Status: COMPLETED | OUTPATIENT
Start: 2021-10-04 | End: 2021-10-04

## 2021-10-04 RX ORDER — MAGNESIUM HYDROXIDE 1200 MG/15ML
LIQUID ORAL AS NEEDED
Status: DISCONTINUED | OUTPATIENT
Start: 2021-10-04 | End: 2021-10-04 | Stop reason: HOSPADM

## 2021-10-04 RX ORDER — OXYCODONE HYDROCHLORIDE AND ACETAMINOPHEN 5; 325 MG/1; MG/1
1-2 TABLET ORAL EVERY 4 HOURS PRN
Qty: 14 TABLET | Refills: 0 | Status: SHIPPED | OUTPATIENT
Start: 2021-10-04 | End: 2022-06-07

## 2021-10-04 RX ORDER — ROCURONIUM BROMIDE 10 MG/ML
INJECTION, SOLUTION INTRAVENOUS AS NEEDED
Status: DISCONTINUED | OUTPATIENT
Start: 2021-10-04 | End: 2021-10-04 | Stop reason: SURG

## 2021-10-04 RX ORDER — ONDANSETRON 4 MG/1
4 TABLET, FILM COATED ORAL ONCE AS NEEDED
Status: DISCONTINUED | OUTPATIENT
Start: 2021-10-04 | End: 2021-10-04 | Stop reason: HOSPADM

## 2021-10-04 RX ORDER — PROMETHAZINE HYDROCHLORIDE 25 MG/1
25 SUPPOSITORY RECTAL ONCE AS NEEDED
Status: DISCONTINUED | OUTPATIENT
Start: 2021-10-04 | End: 2021-10-04 | Stop reason: HOSPADM

## 2021-10-04 RX ORDER — GLYCOPYRROLATE 0.2 MG/ML
0.2 INJECTION INTRAMUSCULAR; INTRAVENOUS
Status: COMPLETED | OUTPATIENT
Start: 2021-10-04 | End: 2021-10-04

## 2021-10-04 RX ORDER — ONDANSETRON 2 MG/ML
4 INJECTION INTRAMUSCULAR; INTRAVENOUS ONCE AS NEEDED
Status: DISCONTINUED | OUTPATIENT
Start: 2021-10-04 | End: 2021-10-04 | Stop reason: HOSPADM

## 2021-10-04 RX ORDER — MEPERIDINE HYDROCHLORIDE 25 MG/ML
12.5 INJECTION INTRAMUSCULAR; INTRAVENOUS; SUBCUTANEOUS
Status: DISCONTINUED | OUTPATIENT
Start: 2021-10-04 | End: 2021-10-04 | Stop reason: HOSPADM

## 2021-10-04 RX ORDER — LIDOCAINE HYDROCHLORIDE 20 MG/ML
INJECTION, SOLUTION INFILTRATION; PERINEURAL AS NEEDED
Status: DISCONTINUED | OUTPATIENT
Start: 2021-10-04 | End: 2021-10-04 | Stop reason: SURG

## 2021-10-04 RX ORDER — OXYCODONE HYDROCHLORIDE 5 MG/1
5 TABLET ORAL
Status: DISCONTINUED | OUTPATIENT
Start: 2021-10-04 | End: 2021-10-04 | Stop reason: HOSPADM

## 2021-10-04 RX ORDER — KETOROLAC TROMETHAMINE 10 MG/1
10 TABLET, FILM COATED ORAL EVERY 6 HOURS PRN
Qty: 8 TABLET | Refills: 0 | Status: SHIPPED | OUTPATIENT
Start: 2021-10-04 | End: 2022-03-26

## 2021-10-04 RX ORDER — CEFAZOLIN SODIUM IN 0.9 % NACL 3 G/100 ML
3 INTRAVENOUS SOLUTION, PIGGYBACK (ML) INTRAVENOUS ONCE
Status: COMPLETED | OUTPATIENT
Start: 2021-10-04 | End: 2021-10-04

## 2021-10-04 RX ORDER — PROMETHAZINE HYDROCHLORIDE 12.5 MG/1
25 TABLET ORAL ONCE AS NEEDED
Status: DISCONTINUED | OUTPATIENT
Start: 2021-10-04 | End: 2021-10-04 | Stop reason: HOSPADM

## 2021-10-04 RX ORDER — DEXAMETHASONE SODIUM PHOSPHATE 4 MG/ML
INJECTION, SOLUTION INTRA-ARTICULAR; INTRALESIONAL; INTRAMUSCULAR; INTRAVENOUS; SOFT TISSUE AS NEEDED
Status: DISCONTINUED | OUTPATIENT
Start: 2021-10-04 | End: 2021-10-04 | Stop reason: SURG

## 2021-10-04 RX ORDER — TAMSULOSIN HYDROCHLORIDE 0.4 MG/1
1 CAPSULE ORAL DAILY
Qty: 10 CAPSULE | Refills: 0 | Status: SHIPPED | OUTPATIENT
Start: 2021-10-04 | End: 2022-06-07

## 2021-10-04 RX ORDER — PHENAZOPYRIDINE HYDROCHLORIDE 200 MG/1
200 TABLET, FILM COATED ORAL 3 TIMES DAILY PRN
Qty: 20 TABLET | Refills: 0 | Status: SHIPPED | OUTPATIENT
Start: 2021-10-04 | End: 2022-06-07

## 2021-10-04 RX ADMIN — ROCURONIUM BROMIDE 40 MG: 10 INJECTION INTRAVENOUS at 10:09

## 2021-10-04 RX ADMIN — PROPOFOL 200 MG: 10 INJECTION, EMULSION INTRAVENOUS at 10:09

## 2021-10-04 RX ADMIN — OXYCODONE HYDROCHLORIDE 5 MG: 5 TABLET ORAL at 11:46

## 2021-10-04 RX ADMIN — SODIUM CHLORIDE, POTASSIUM CHLORIDE, SODIUM LACTATE AND CALCIUM CHLORIDE 9 ML/HR: 600; 310; 30; 20 INJECTION, SOLUTION INTRAVENOUS at 08:21

## 2021-10-04 RX ADMIN — ONDANSETRON 4 MG: 2 INJECTION INTRAMUSCULAR; INTRAVENOUS at 10:19

## 2021-10-04 RX ADMIN — LIDOCAINE HYDROCHLORIDE 50 MG: 20 INJECTION, SOLUTION INFILTRATION; PERINEURAL at 10:09

## 2021-10-04 RX ADMIN — FENTANYL CITRATE 100 MCG: 50 INJECTION INTRAMUSCULAR; INTRAVENOUS at 10:09

## 2021-10-04 RX ADMIN — SUGAMMADEX 200 MG: 100 INJECTION, SOLUTION INTRAVENOUS at 10:51

## 2021-10-04 RX ADMIN — GLYCOPYRROLATE 0.2 MG: 0.2 INJECTION INTRAMUSCULAR; INTRAVENOUS at 09:52

## 2021-10-04 RX ADMIN — ACETAMINOPHEN 1000 MG: 500 TABLET ORAL at 09:50

## 2021-10-04 RX ADMIN — Medication 2 G: at 10:11

## 2021-10-04 RX ADMIN — MIDAZOLAM HYDROCHLORIDE 2 MG: 1 INJECTION, SOLUTION INTRAMUSCULAR; INTRAVENOUS at 09:52

## 2021-10-04 RX ADMIN — ROCURONIUM BROMIDE 10 MG: 10 INJECTION INTRAVENOUS at 10:43

## 2021-10-04 RX ADMIN — DEXAMETHASONE SODIUM PHOSPHATE 4 MG: 4 INJECTION INTRA-ARTICULAR; INTRALESIONAL; INTRAMUSCULAR; INTRAVENOUS; SOFT TISSUE at 10:20

## 2021-10-04 NOTE — ANESTHESIA POSTPROCEDURE EVALUATION
Patient: Sylvia Oliva    Procedure Summary     Date: 10/04/21 Room / Location: [facility] OR 07 / [facility] MAIN OR    Anesthesia Start: 1004 Anesthesia Stop: 1059    Procedure: CYSTOSCOPY RIGHT URETEROSCOPY, RETROGRADE PYELOGRAM, LASERTRIPSY, STONE BASKET EXTRACTION AND STENT INSERTION (Right Ureter) Diagnosis:       Right ureteral stone      (Right ureteral stone [N20.1])    Surgeons: Amanda Orona MD Provider: Akanksha Landers DO    Anesthesia Type: general ASA Status: 2          Anesthesia Type: general    Vitals  Vitals Value Taken Time   /72 10/04/21 1124   Temp 36.2 °C (97.2 °F) 10/04/21 1101   Pulse 90 10/04/21 1127   Resp 18 10/04/21 1116   SpO2 92 % 10/04/21 1127   Vitals shown include unvalidated device data.        Post Anesthesia Care and Evaluation    Patient location during evaluation: bedside  Patient participation: complete - patient participated  Level of consciousness: awake  Pain management: adequate  Airway patency: patent  Anesthetic complications: No anesthetic complications  PONV Status: none  Cardiovascular status: acceptable and stable  Respiratory status: acceptable and room air  Hydration status: acceptable    Comments: An Anesthesiologist personally participated in the most demanding procedures (including induction and emergence if applicable) in the anesthesia plan, monitored the course of anesthesia administration at frequent intervals and remained physically present and available for immediate diagnosis and treatment of emergencies.

## 2021-10-04 NOTE — ANESTHESIA PREPROCEDURE EVALUATION
Anesthesia Evaluation     Patient summary reviewed and Nursing notes reviewed   history of anesthetic complications: PONV  NPO Solid Status: > 8 hours  NPO Liquid Status: > 2 hours           Airway   Mallampati: II  TM distance: >3 FB  Neck ROM: full  Possible difficult intubation  Dental - normal exam     Pulmonary - normal exam   (+) asthma (Well-controlled. Exercise-induced. Treated in the ER as a child. Denies intubation for asthma.),  (-) not a smoker  Cardiovascular - negative cardio ROS and normal exam  Exercise tolerance: good (4-7 METS)    ECG reviewed        Neuro/Psych  (+) psychiatric history Depression,     GI/Hepatic/Renal/Endo    (+)   renal disease stones,     Musculoskeletal (-) negative ROS    Abdominal   (+) obese,    Substance History - negative use     OB/GYN negative ob/gyn ROS         Other - negative ROS                       Anesthesia Plan    ASA 2     general   (Patient understands anesthesia not responsible for dental damage.)  intravenous induction     Anesthetic plan, all risks, benefits, and alternatives have been provided, discussed and informed consent has been obtained with: patient.

## 2021-10-04 NOTE — OP NOTE
Preoperative diagnosis  right ureteral  stone    Postoperative diagnosis  right ureteral stone    Procedure performed  1.  cystoscopy, right retrograde pyelogram with ureteral stent placement 6x24 Fr   2.  right ureteroscopy with holmium-YAG laser lithotripsy and basket extraction of stone fragments (57276)   3.  Fluoroscopy time < 1 hour with interpretation of images    Surgeon  Amanda Orona MD    Anesthesia  General    Complications  None    Specimen  Stone fragments for biochemical analysis    Findings  Upon inspection, patient with large right urethral polyp; able to insert cystoscope without difficulty. Cystoscopy revealed no tumors, stones or other mucosal abnormalities. Bilateral orthotopic ureters.    right retrograde pyelogram revealed a delicate system with identification of the stone seen on pre-op imaging.  No other filling defects and caliber of right ureter was smooth and normal.    Ureteroscopy revealed no ureteral stones; renal pelvis stone treated with laser lithotripsy.     Estimated blood loss  0 cc    Indications  21 y.o. female agreed to undergo the above named procedure after discussion of the alternatives, risks and benefits.  Informed consent was obtained.      Procedure  After informed consent was obtained, the patient was taken back to the  operating suite where general anesthesia was administered. Once the patient  was adequately anesthetized, she was placed in the dorsal lithotomy position.  her  genitals were prepped and draped in a normal sterile fashion. An approximate 1.5 cm urethral polyp on the right was appreciated. Cystoscope was inserted gently in the patient's urethra meatus, which passed  atraumatically into the bladder; pan cystoscopy was performed in a typical 360 degree manner, no mucosal abnormalities were noted.  Bilateral orthotopic ureters.  Pollack catheter was then placed in the distal right ureter in order to obtain retrograde  pyelogram. Retrograde pyelogram was  performed revealing mild proximal hydronephrosis,  the calculus was noted. The wire  was then replaced after retrograde pyelogram, the Pollack was reduced.  Second sensor wire was placed into the renal pelvis. The  access sheath was passed level of the mid ureter.  The ureteroscope was then assembled and ureteroscopy  proceeded in a systematic manner until the stone was  encountered in the renal pelvis.  Once the stone was encountered, laser  lithotripsy was then initiated.  Laser lithotripsy was utilized to dust and  fragment the calculus into tiny pieces.  Any sizable  fragments were then basketed out. After adequate treatment of the stone, nephroscopy then proceeded in a systematic manner.   Once there were no further stone fragments, ureteroscopy proceeded down the length of the ureter  with retrieval of the access sheath, leaving the safety wire in place. There were no further ureteral calculi  or fragments. The ureter appeared healthy and viable.  After withdrawing the  ureteroscope, as well as the access sheath, the wire was back loaded through  the cystoscope and 6 x 24 double J stent was placed over the wire under  direct visualization.  Upon retrieval of the wire, there was good proximal  coil noted on x-ray, as well as distal coil within the bladder. At this  point, the procedure was deemed terminated.  The patient's bladder was then emptied and the cystoscope removed.  she  was then placed back in the supine position and awakened from general  anesthesia and transferred to the PACU in good condition.

## 2021-10-05 LAB
LAB AP CASE REPORT: NORMAL
LAB AP CLINICAL INFORMATION: NORMAL
PATH REPORT.FINAL DX SPEC: NORMAL
PATH REPORT.GROSS SPEC: NORMAL

## 2021-10-08 LAB
CALCIUM OXALATE DIHYDRATE MFR STONE IR: 60 %
COLOR STONE: NORMAL
COM MFR STONE: 30 %
COMPN STONE: NORMAL
HYDROXYAPATITE 24H ENGDIFF UR: 10 %
LABORATORY COMMENT REPORT: NORMAL
Lab: NORMAL
Lab: NORMAL
PHOTO: NORMAL
SIZE STONE: NORMAL MM
SPEC SOURCE SUBJ: NORMAL
WT STONE: 71 MG

## 2021-10-12 PROBLEM — R93.7 ABNORMAL BONE DENSITY SCREENING: Status: ACTIVE | Noted: 2018-12-01

## 2021-10-12 PROBLEM — J30.2 SEASONAL ALLERGIC RHINITIS: Status: ACTIVE | Noted: 2021-10-12

## 2021-10-12 PROBLEM — F32.A DEPRESSION: Status: ACTIVE | Noted: 2021-10-12

## 2021-10-12 PROBLEM — B07.0 PLANTAR WART: Status: ACTIVE | Noted: 2021-10-12

## 2021-10-12 PROBLEM — J45.909 ASTHMA: Status: ACTIVE | Noted: 2021-10-12

## 2021-10-14 ENCOUNTER — OFFICE VISIT (OUTPATIENT)
Dept: UROLOGY | Facility: CLINIC | Age: 21
End: 2021-10-14

## 2021-10-14 VITALS — BODY MASS INDEX: 42.49 KG/M2 | HEIGHT: 65 IN | WEIGHT: 255 LBS

## 2021-10-14 DIAGNOSIS — T19.9XXA FOREIGN BODY IN GENITOURINARY TRACT, INITIAL ENCOUNTER: Primary | ICD-10-CM

## 2021-10-14 DIAGNOSIS — N13.30 HYDRONEPHROSIS, UNSPECIFIED HYDRONEPHROSIS TYPE: ICD-10-CM

## 2021-10-14 PROCEDURE — 52310 CYSTOSCOPY AND TREATMENT: CPT | Performed by: UROLOGY

## 2021-10-14 NOTE — PROGRESS NOTES
Preoperative diagnosis  Foreign body in genitourinary tract    Postoperative diagnosis  Same as above    Procedure  Flexible cystourethroscopy with stent removal    Attending surgeon  Amanda Orona MD     Anesthesia  2% lidocaine jelly intraurethrally    Complications  None    Specimen  None    Estimated blood loss  0cc    Indications  21 y.o. female who is status post right ureteroscopy with stone treatment who presents for stent removal.    Procedure  The patient was appropriately identified and brought to the cytoscopy suite. A timeout was undertaken documenting the correct patient, site, and procedure. The patient was prepped in a normal sterile fashion. A flexible cytoscope was then introduced into the bladder. The stent was identified and grasped with endoscopic graspers. The entire stent was removed and passed off the field. Patient tolerated the procedure well. There were no intraprocedural complications.     Plan  Tolerated procedure well.  Instructions provided.  Patient follow-up in 4 to 6 weeks with renal ultrasound prior or earlier as needed  All question addressed

## 2021-10-18 ENCOUNTER — TELEPHONE (OUTPATIENT)
Dept: FAMILY MEDICINE CLINIC | Facility: CLINIC | Age: 21
End: 2021-10-18

## 2021-10-18 NOTE — TELEPHONE ENCOUNTER
Caller: Sylvia Oliva    Relationship to patient: Self    Best call back number: [phone]    Chief complaint: MEDICATION     Type of visit: OFFICE VISIT    Requested date: 10-29 BEFORE 1PM OR ANY Friday     If rescheduling, when is the original appointment:     Additional notes:PATIENT STATES THAT SHE HAS A SCHOOL SCHEDULE AND THE ABOVE DATE IS WHAT WORKS FOR HER.

## 2021-11-30 ENCOUNTER — TELEPHONE (OUTPATIENT)
Dept: UROLOGY | Facility: CLINIC | Age: 21
End: 2021-11-30

## 2021-11-30 NOTE — TELEPHONE ENCOUNTER
Pt is scheduled 12/2 for f/u BINU. BINU isn't scheduled until 12/10. Pt needs to be re-scheduled to a later date. LMOM for cb.

## 2021-12-02 ENCOUNTER — TELEPHONE (OUTPATIENT)
Dept: UROLOGY | Facility: CLINIC | Age: 21
End: 2021-12-02

## 2021-12-02 NOTE — TELEPHONE ENCOUNTER
LMOM THAT WE DO NOT NEED TO SEE HER TODAY SINCE HER BINU ISN'T UNTIL 12/10. TOLD HER TO CB TO R/S TODAY'S APPT FOR AFTER 12/10.

## 2021-12-06 ENCOUNTER — TELEPHONE (OUTPATIENT)
Dept: SURGERY | Facility: CLINIC | Age: 21
End: 2021-12-06

## 2021-12-06 NOTE — TELEPHONE ENCOUNTER
Called pt to see when she would like to make and apt for after 12/10 for go over the results. jody

## 2021-12-10 ENCOUNTER — APPOINTMENT (OUTPATIENT)
Dept: ULTRASOUND IMAGING | Facility: HOSPITAL | Age: 21
End: 2021-12-10

## 2021-12-20 ENCOUNTER — TELEPHONE (OUTPATIENT)
Dept: UROLOGY | Facility: CLINIC | Age: 21
End: 2021-12-20

## 2021-12-28 ENCOUNTER — TELEPHONE (OUTPATIENT)
Dept: UROLOGY | Facility: CLINIC | Age: 21
End: 2021-12-28

## 2022-01-03 ENCOUNTER — TELEPHONE (OUTPATIENT)
Dept: UROLOGY | Facility: CLINIC | Age: 22
End: 2022-01-03

## 2022-03-26 ENCOUNTER — HOSPITAL ENCOUNTER (EMERGENCY)
Facility: HOSPITAL | Age: 22
Discharge: HOME | End: 2022-03-26
Attending: EMERGENCY MEDICINE | Admitting: EMERGENCY MEDICINE

## 2022-03-26 ENCOUNTER — APPOINTMENT (OUTPATIENT)
Dept: GENERAL RADIOLOGY | Facility: HOSPITAL | Age: 22
End: 2022-03-26

## 2022-03-26 VITALS
SYSTOLIC BLOOD PRESSURE: 128 MMHG | HEIGHT: 65 IN | TEMPERATURE: 98 F | HEART RATE: 96 BPM | RESPIRATION RATE: 16 BRPM | WEIGHT: 241.4 LBS | BODY MASS INDEX: 40.22 KG/M2 | DIASTOLIC BLOOD PRESSURE: 71 MMHG | OXYGEN SATURATION: 100 %

## 2022-03-26 DIAGNOSIS — S93.402A SPRAIN OF LEFT ANKLE, UNSPECIFIED LIGAMENT, INITIAL ENCOUNTER: Primary | ICD-10-CM

## 2022-03-26 PROCEDURE — 25010000002 KETOROLAC TROMETHAMINE PER 15 MG: Performed by: NURSE PRACTITIONER

## 2022-03-26 PROCEDURE — 96372 THER/PROPH/DIAG INJ SC/IM: CPT

## 2022-03-26 PROCEDURE — 73610 X-RAY EXAM OF ANKLE: CPT

## 2022-03-26 PROCEDURE — 99283 EMERGENCY DEPT VISIT LOW MDM: CPT

## 2022-03-26 RX ORDER — KETOROLAC TROMETHAMINE 30 MG/ML
30 INJECTION, SOLUTION INTRAMUSCULAR; INTRAVENOUS ONCE
Status: COMPLETED | OUTPATIENT
Start: 2022-03-26 | End: 2022-03-26

## 2022-03-26 RX ORDER — KETOROLAC TROMETHAMINE 10 MG/1
10 TABLET, FILM COATED ORAL EVERY 6 HOURS PRN
Qty: 20 TABLET | Refills: 0 | Status: SHIPPED | OUTPATIENT
Start: 2022-03-26 | End: 2022-06-07

## 2022-03-26 RX ADMIN — KETOROLAC TROMETHAMINE 30 MG: 30 INJECTION, SOLUTION INTRAMUSCULAR; INTRAVENOUS at 19:39

## 2022-06-07 ENCOUNTER — HOSPITAL ENCOUNTER (OUTPATIENT)
Dept: GENERAL RADIOLOGY | Facility: HOSPITAL | Age: 22
Discharge: HOME | End: 2022-06-07
Admitting: NURSE PRACTITIONER

## 2022-06-07 ENCOUNTER — OFFICE VISIT (OUTPATIENT)
Dept: FAMILY MEDICINE CLINIC | Facility: CLINIC | Age: 22
End: 2022-06-07

## 2022-06-07 VITALS
OXYGEN SATURATION: 99 % | TEMPERATURE: 98.2 F | DIASTOLIC BLOOD PRESSURE: 60 MMHG | BODY MASS INDEX: 41.85 KG/M2 | HEART RATE: 79 BPM | HEIGHT: 65 IN | SYSTOLIC BLOOD PRESSURE: 107 MMHG | WEIGHT: 251.2 LBS

## 2022-06-07 DIAGNOSIS — M25.532 LEFT WRIST PAIN: Primary | ICD-10-CM

## 2022-06-07 DIAGNOSIS — M54.50 CHRONIC LOW BACK PAIN, UNSPECIFIED BACK PAIN LATERALITY, UNSPECIFIED WHETHER SCIATICA PRESENT: Primary | ICD-10-CM

## 2022-06-07 DIAGNOSIS — M54.9 BACK PAIN, UNSPECIFIED BACK LOCATION, UNSPECIFIED BACK PAIN LATERALITY, UNSPECIFIED CHRONICITY: ICD-10-CM

## 2022-06-07 DIAGNOSIS — G89.29 CHRONIC LOW BACK PAIN, UNSPECIFIED BACK PAIN LATERALITY, UNSPECIFIED WHETHER SCIATICA PRESENT: Primary | ICD-10-CM

## 2022-06-07 PROCEDURE — 72110 X-RAY EXAM L-2 SPINE 4/>VWS: CPT

## 2022-06-07 PROCEDURE — 99214 OFFICE O/P EST MOD 30 MIN: CPT | Performed by: NURSE PRACTITIONER

## 2022-06-07 RX ORDER — MELOXICAM 7.5 MG/1
7.5 TABLET ORAL DAILY
Qty: 30 TABLET | Refills: 1 | Status: SHIPPED | OUTPATIENT
Start: 2022-06-07

## 2022-06-07 NOTE — PROGRESS NOTES
Chief Complaint  Ankle Pain, Wrist Pain (bilateral), Back Pain, and Shoulder Pain    Subjective          Sylvia Oliva presents to [facility]  [facility]    Back Pain  This is a chronic problem. The current episode started more than 1 year ago. The problem occurs daily. The pain is present in the lumbar spine. The quality of the pain is described as aching. The pain is mild. The symptoms are aggravated by standing, sitting and position. Risk factors include obesity and poor posture. She has tried analgesics, chiropractic manipulation and walking for the symptoms. The treatment provided mild relief.       Patient complaining of lumbago for several years, NKI.  Patient states she has gone to a chiropractor and gotten short term relief.    Patient complaining of bilateral wrist pain, worst on left.  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 Pt is right handed and states the right hand sometimes goes numb at times. Pt does state she wakes up at night with numbness.     Patient complaining of intermittent ankle pain (she thinks it is her left ankle) after falling approximately 2 months ago.  Patient states she went to ER and was diagnosed with a sprain.  Patient states it has improved significantly but still hurts sometimes.    Past Medical History:   Diagnosis Date   • Asthma    • Depression    • Kidney stone    • Plantar wart    • Seasonal allergies          No Known Allergies       Past Surgical History:   Procedure Laterality Date   • CYSTOSCOPY URETEROSCOPY Right 10/4/2021    Procedure: CYSTOSCOPY RIGHT URETEROSCOPY, RETROGRADE PYELOGRAM, LASERTRIPSY, STONE BASKET EXTRACTION AND STENT INSERTION;  Surgeon: Amanda Orona MD;  Location: [facility];  Service: Urology;  Laterality: Right;   • ENDOSCOPY     • MOUTH SURGERY            Social History     Tobacco Use   • Smoking status: Never Smoker   • Smokeless tobacco:  "Never Used   Substance Use Topics   • Alcohol use: Yes     Comment: occasionally         Family History   Problem Relation Age of Onset   • Hypertension Mother    • Hypertension Maternal Grandfather    • Kidney cancer Maternal Grandfather           Current Outpatient Medications on File Prior to Visit   Medication Sig   • albuterol sulfate  (90 Base) MCG/ACT inhaler albuterol sulfate 90 mcg/actuation inhalation HFA aerosol inhaler inhale 1 - 2 puffs (90 - 180 mcg) by inhalation route every 4-6 hours as needed   Active   • azelastine (ASTELIN) 0.1 % nasal spray 2 sprays into the nostril(s) as directed by provider 2 (Two) Times a Day. Use in each nostril as directed   • FLUoxetine (PROzac) 10 MG capsule Take 10 mg by mouth Every Night.   • fluticasone (FLONASE) 50 MCG/ACT nasal spray Flonase Allergy Relief 50 mcg/actuation nasal spray,suspension spray 1 - 2 sprays (50 - 100 mcg) in each nostril by intranasal route once daily as needed   Active   • Fluticasone Furoate-Vilanterol (Breo Ellipta) 200-25 MCG/INH inhaler Breo Ellipta 200-25 mcg/dose inhalation blister with device inhale 1 puff by inhalation route once daily at the same time each day   Active   • montelukast (SINGULAIR) 10 MG tablet Take 10 mg by mouth Every Night.     No current facility-administered medications on file prior to visit.         Immunization History   Administered Date(s) Administered   • COVID-19 (PFIZER) PURPLE CAP 04/10/2021, 05/01/2021, 11/02/2021   • Flublok 18+yrs 09/30/2019, 10/22/2021   • Fluzone Split Quad (Multi-dose) 10/10/2020         /60 (BP Location: Left arm, Patient Position: Sitting, Cuff Size: Adult)   Pulse 79   Temp 98.2 °F (36.8 °C) (Oral)   Ht 165.1 cm (65\")   Wt 114 kg (251 lb 3.2 oz)   SpO2 99%   BMI 41.80 kg/m²             Physical Exam  Vitals reviewed.   Constitutional:       Appearance: Normal appearance. She is well-developed.   HENT:      Head: Normocephalic and atraumatic.      Right Ear: " External ear normal.      Left Ear: External ear normal.      Mouth/Throat:      Pharynx: No oropharyngeal exudate.   Eyes:      Conjunctiva/sclera: Conjunctivae normal.      Pupils: Pupils are equal, round, and reactive to light.   Cardiovascular:      Rate and Rhythm: Normal rate and regular rhythm.      Heart sounds: No murmur heard.    No friction rub. No gallop.   Pulmonary:      Effort: Pulmonary effort is normal.      Breath sounds: Normal breath sounds. No wheezing or rhonchi.   Musculoskeletal:      Comments: Positive phalens test bilaterally   Skin:     General: Skin is warm and dry.   Neurological:      Mental Status: She is alert and oriented to person, place, and time.      Cranial Nerves: No cranial nerve deficit.   Psychiatric:         Mood and Affect: Mood and affect normal.         Behavior: Behavior normal.         Thought Content: Thought content normal.         Judgment: Judgment normal.             Result Review :                           Assessment and Plan      Diagnoses and all orders for this visit:    1. Left wrist pain (Primary)  Comments:  Trial of meloxicam daily, side effects administration addressed.  Ice to area as needed, encouraged use of support brace.  Orders:  -     meloxicam (Mobic) 7.5 MG tablet; Take 1 tablet by mouth Daily.  Dispense: 30 tablet; Refill: 1  -     EMG & Nerve Conduction Test; Future    2. Back pain, unspecified back location, unspecified back pain laterality, unspecified chronicity  Comments:  Will check x-rays  Orders:  -     XR Spine Lumbar 4+ View; Future  -     meloxicam (Mobic) 7.5 MG tablet; Take 1 tablet by mouth Daily.  Dispense: 30 tablet; Refill: 1              Follow Up     Return if symptoms worsen or fail to improve.    Patient was given instructions and counseling regarding her condition or for health maintenance advice. Please see specific information pulled into the AVS if appropriate.

## 2023-02-13 ENCOUNTER — OFFICE VISIT (OUTPATIENT)
Dept: FAMILY MEDICINE CLINIC | Facility: CLINIC | Age: 23
End: 2023-02-13
Payer: COMMERCIAL

## 2023-02-13 ENCOUNTER — LAB (OUTPATIENT)
Dept: LAB | Facility: HOSPITAL | Age: 23
End: 2023-02-13
Payer: COMMERCIAL

## 2023-02-13 VITALS
TEMPERATURE: 98.5 F | BODY MASS INDEX: 39.45 KG/M2 | SYSTOLIC BLOOD PRESSURE: 115 MMHG | HEIGHT: 65 IN | DIASTOLIC BLOOD PRESSURE: 63 MMHG | OXYGEN SATURATION: 99 % | HEART RATE: 70 BPM | WEIGHT: 236.8 LBS

## 2023-02-13 DIAGNOSIS — Z13.220 ENCOUNTER FOR LIPID SCREENING FOR CARDIOVASCULAR DISEASE: ICD-10-CM

## 2023-02-13 DIAGNOSIS — F90.0 ATTENTION DEFICIT HYPERACTIVITY DISORDER (ADHD), PREDOMINANTLY INATTENTIVE TYPE: ICD-10-CM

## 2023-02-13 DIAGNOSIS — J45.909 UNCOMPLICATED ASTHMA, UNSPECIFIED ASTHMA SEVERITY, UNSPECIFIED WHETHER PERSISTENT: ICD-10-CM

## 2023-02-13 DIAGNOSIS — Z01.89 ROUTINE LAB DRAW: ICD-10-CM

## 2023-02-13 DIAGNOSIS — R40.0 DAYTIME SOMNOLENCE: ICD-10-CM

## 2023-02-13 DIAGNOSIS — Z11.59 NEED FOR HEPATITIS C SCREENING TEST: ICD-10-CM

## 2023-02-13 DIAGNOSIS — Z13.6 ENCOUNTER FOR LIPID SCREENING FOR CARDIOVASCULAR DISEASE: ICD-10-CM

## 2023-02-13 DIAGNOSIS — F32.A DEPRESSION, UNSPECIFIED DEPRESSION TYPE: ICD-10-CM

## 2023-02-13 DIAGNOSIS — Z13.29 SCREENING FOR THYROID DISORDER: ICD-10-CM

## 2023-02-13 DIAGNOSIS — R10.13 DYSPEPSIA: ICD-10-CM

## 2023-02-13 DIAGNOSIS — F41.9 ANXIETY: Primary | ICD-10-CM

## 2023-02-13 LAB
ALBUMIN SERPL-MCNC: 4.3 G/DL (ref 3.5–5.2)
ALBUMIN/GLOB SERPL: 1.4 G/DL
ALP SERPL-CCNC: 74 U/L (ref 39–117)
ALT SERPL W P-5'-P-CCNC: 23 U/L (ref 1–33)
ANION GAP SERPL CALCULATED.3IONS-SCNC: 8.2 MMOL/L (ref 5–15)
AST SERPL-CCNC: 23 U/L (ref 1–32)
BASOPHILS # BLD AUTO: 0.06 10*3/MM3 (ref 0–0.2)
BASOPHILS NFR BLD AUTO: 0.6 % (ref 0–1.5)
BILIRUB SERPL-MCNC: 0.5 MG/DL (ref 0–1.2)
BUN SERPL-MCNC: 9 MG/DL (ref 6–20)
BUN/CREAT SERPL: 13.4 (ref 7–25)
CALCIUM SPEC-SCNC: 9.4 MG/DL (ref 8.6–10.5)
CHLORIDE SERPL-SCNC: 102 MMOL/L (ref 98–107)
CHOLEST SERPL-MCNC: 150 MG/DL (ref 0–200)
CO2 SERPL-SCNC: 25.8 MMOL/L (ref 22–29)
CREAT SERPL-MCNC: 0.67 MG/DL (ref 0.57–1)
DEPRECATED RDW RBC AUTO: 40.3 FL (ref 37–54)
EGFRCR SERPLBLD CKD-EPI 2021: 125.3 ML/MIN/1.73
EOSINOPHIL # BLD AUTO: 0.11 10*3/MM3 (ref 0–0.4)
EOSINOPHIL NFR BLD AUTO: 1.1 % (ref 0.3–6.2)
ERYTHROCYTE [DISTWIDTH] IN BLOOD BY AUTOMATED COUNT: 13 % (ref 12.3–15.4)
GLOBULIN UR ELPH-MCNC: 3.1 GM/DL
GLUCOSE SERPL-MCNC: 79 MG/DL (ref 65–99)
HCT VFR BLD AUTO: 41.2 % (ref 34–46.6)
HCV AB SER DONR QL: NORMAL
HDLC SERPL-MCNC: 64 MG/DL (ref 40–60)
HGB BLD-MCNC: 13.6 G/DL (ref 12–15.9)
IMM GRANULOCYTES # BLD AUTO: 0.07 10*3/MM3 (ref 0–0.05)
IMM GRANULOCYTES NFR BLD AUTO: 0.7 % (ref 0–0.5)
LDLC SERPL CALC-MCNC: 76 MG/DL (ref 0–100)
LDLC/HDLC SERPL: 1.21 {RATIO}
LYMPHOCYTES # BLD AUTO: 2.18 10*3/MM3 (ref 0.7–3.1)
LYMPHOCYTES NFR BLD AUTO: 22.5 % (ref 19.6–45.3)
MCH RBC QN AUTO: 28.3 PG (ref 26.6–33)
MCHC RBC AUTO-ENTMCNC: 33 G/DL (ref 31.5–35.7)
MCV RBC AUTO: 85.7 FL (ref 79–97)
MONOCYTES # BLD AUTO: 0.75 10*3/MM3 (ref 0.1–0.9)
MONOCYTES NFR BLD AUTO: 7.7 % (ref 5–12)
NEUTROPHILS NFR BLD AUTO: 6.54 10*3/MM3 (ref 1.7–7)
NEUTROPHILS NFR BLD AUTO: 67.4 % (ref 42.7–76)
NRBC BLD AUTO-RTO: 0 /100 WBC (ref 0–0.2)
PLATELET # BLD AUTO: 359 10*3/MM3 (ref 140–450)
PMV BLD AUTO: 9.6 FL (ref 6–12)
POTASSIUM SERPL-SCNC: 4.2 MMOL/L (ref 3.5–5.2)
PROT SERPL-MCNC: 7.4 G/DL (ref 6–8.5)
RBC # BLD AUTO: 4.81 10*6/MM3 (ref 3.77–5.28)
SODIUM SERPL-SCNC: 136 MMOL/L (ref 136–145)
TRIGL SERPL-MCNC: 43 MG/DL (ref 0–150)
TSH SERPL DL<=0.05 MIU/L-ACNC: 4.38 UIU/ML (ref 0.27–4.2)
VLDLC SERPL-MCNC: 10 MG/DL (ref 5–40)
WBC NRBC COR # BLD: 9.71 10*3/MM3 (ref 3.4–10.8)

## 2023-02-13 PROCEDURE — 80061 LIPID PANEL: CPT

## 2023-02-13 PROCEDURE — 36415 COLL VENOUS BLD VENIPUNCTURE: CPT

## 2023-02-13 PROCEDURE — 86803 HEPATITIS C AB TEST: CPT

## 2023-02-13 PROCEDURE — 86677 HELICOBACTER PYLORI ANTIBODY: CPT

## 2023-02-13 PROCEDURE — 99214 OFFICE O/P EST MOD 30 MIN: CPT | Performed by: NURSE PRACTITIONER

## 2023-02-13 PROCEDURE — 80050 GENERAL HEALTH PANEL: CPT

## 2023-02-13 RX ORDER — FLUOXETINE 10 MG/1
10 CAPSULE ORAL NIGHTLY
Qty: 90 CAPSULE | Refills: 1 | Status: SHIPPED | OUTPATIENT
Start: 2023-02-13

## 2023-02-13 RX ORDER — ATOMOXETINE 60 MG/1
1 CAPSULE ORAL DAILY
COMMUNITY
Start: 2022-11-13 | End: 2023-02-13 | Stop reason: SDUPTHER

## 2023-02-13 RX ORDER — ATOMOXETINE 60 MG/1
60 CAPSULE ORAL DAILY
Qty: 90 CAPSULE | Refills: 1 | Status: SHIPPED | OUTPATIENT
Start: 2023-02-13

## 2023-02-13 RX ORDER — SERDEXMETHYLPHENIDATE AND DEXMETHYLPHENIDATE 7.8; 39.2 MG/1; MG/1
1 CAPSULE ORAL AS NEEDED
COMMUNITY
Start: 2022-11-18

## 2023-02-13 RX ORDER — FLUTICASONE FUROATE AND VILANTEROL 200; 25 UG/1; UG/1
1 POWDER RESPIRATORY (INHALATION)
Qty: 3 EACH | Refills: 1 | Status: SHIPPED | OUTPATIENT
Start: 2023-02-13

## 2023-02-13 RX ORDER — ALBUTEROL SULFATE 90 UG/1
2 AEROSOL, METERED RESPIRATORY (INHALATION) EVERY 6 HOURS PRN
Qty: 18 G | Refills: 1 | Status: SHIPPED | OUTPATIENT
Start: 2023-02-13

## 2023-02-13 RX ORDER — MONTELUKAST SODIUM 10 MG/1
10 TABLET ORAL NIGHTLY
Qty: 90 TABLET | Refills: 1 | Status: SHIPPED | OUTPATIENT
Start: 2023-02-13

## 2023-02-13 NOTE — PROGRESS NOTES
Chief Complaint  Follow-up, Anxiety, Depression, and Asthma    SUBJECTIVE  Sylvia Oliva presents to [facility]     Anxiety/Depression:  Patient is taking Prozac with good control of symptoms.  Patient denies suicidal ideations or thoughts of harming herself or others.  Patient states she is no longer seeing psych due to insurance coverage.    Attention Deficit Disorder:  Patient is taking Strattera, with good control of symptoms.  Patient states she is able to concentrate and focus on tasks.  Patient denies any side effects from medication.  Patient no longer seeing psych due to insurance coverage.    Sleep Disorder:  Patient is taking Azstarys.  Patient notes she does get crippling headaches when she takes this medication.  Patient was getting Rx from psych but she is no longer seeing her due to insurance coverage.  Patient's last sleep study was in 2016.  Patient states her symptoms have worsened so bad that it is impacting her quality of life.  She states she is fatigued all the time and sleeps too much. She was given stimulant per psychiatry in [place]. She states she gets headache when the medication wears off, so she is     Seasonal Allergies/Asthma:  Patient is taking Azelastine, Montelukast, Breo Ellipta, Albuterol HFA PRN and doing well.  Patient states she has not been able to get in to see her provider at [facility] due to her schedule.she states she does have to use her rescue inhaler   Joint Pain:  Patient is taking Meloxicam PRN.    Patient concerned about 15 pound weight loss since 6/7/22.  She states she did not intend to loose the weight and it concerns her.  Patient states her appetite has decreased and she gets full faster.    Patient requesting to get EMG results from 7/22/22.      History of Present Illness  Past Medical History:   Diagnosis Date   • Asthma    • Depression    • Kidney stone    • Plantar wart    • Seasonal allergies      "  Family History   Problem Relation Age of Onset   • Hypertension Mother    • Hypertension Maternal Grandfather    • Kidney cancer Maternal Grandfather       Past Surgical History:   Procedure Laterality Date   • CYSTOSCOPY URETEROSCOPY Right 10/4/2021    Procedure: CYSTOSCOPY RIGHT URETEROSCOPY, RETROGRADE PYELOGRAM, LASERTRIPSY, STONE BASKET EXTRACTION AND STENT INSERTION;  Surgeon: Amanda Orona MD;  Location: [facility];  Service: Urology;  Laterality: Right;   • ENDOSCOPY     • MOUTH SURGERY          Current Outpatient Medications:   •  albuterol sulfate  (90 Base) MCG/ACT inhaler, Inhale 2 puffs Every 6 (Six) Hours As Needed for Wheezing., Disp: 18 g, Rfl: 1  •  atomoxetine (STRATTERA) 60 MG capsule, Take 1 capsule by mouth Daily., Disp: 90 capsule, Rfl: 1  •  azelastine (ASTELIN) 0.1 % nasal spray, 2 sprays into the nostril(s) as directed by provider 2 (Two) Times a Day. Use in each nostril as directed, Disp: , Rfl:   •  FLUoxetine (PROzac) 10 MG capsule, Take 1 capsule by mouth Every Night., Disp: 90 capsule, Rfl: 1  •  Fluticasone Furoate-Vilanterol (Breo Ellipta) 200-25 MCG/ACT inhaler, Inhale 1 puff Daily., Disp: 3 each, Rfl: 1  •  meloxicam (Mobic) 7.5 MG tablet, Take 1 tablet by mouth Daily., Disp: 30 tablet, Rfl: 1  •  montelukast (SINGULAIR) 10 MG tablet, Take 1 tablet by mouth Every Night., Disp: 90 tablet, Rfl: 1  •  Serdexmethylphen-Dexmethylphen (Azstarys) 39.2-7.8 MG capsule, Take 1 capsule by mouth As Needed., Disp: , Rfl:     OBJECTIVE  Vital Signs:   /63 (BP Location: Left arm, Patient Position: Sitting, Cuff Size: Large Adult)   Pulse 70   Temp 98.5 °F (36.9 °C) (Oral)   Ht 165.1 cm (65\")   Wt 107 kg (236 lb 12.8 oz)   SpO2 99%   BMI 39.41 kg/m²    Estimated body mass index is 39.41 kg/m² as calculated from the following:    Height as of this encounter: 165.1 cm (65\").    Weight as of this encounter: 107 kg (236 lb 12.8 oz).     Wt Readings from Last 3 Encounters: "   02/13/23 107 kg (236 lb 12.8 oz)   06/07/22 114 kg (251 lb 3.2 oz)   03/26/22 110 kg (241 lb 6.5 oz)     BP Readings from Last 3 Encounters:   02/13/23 115/63   06/07/22 107/60   03/26/22 128/71       Physical Exam  Vitals reviewed.   Constitutional:       Appearance: Normal appearance. She is well-developed.   HENT:      Head: Normocephalic and atraumatic.      Right Ear: External ear normal.      Left Ear: External ear normal.      Mouth/Throat:      Pharynx: No oropharyngeal exudate.   Eyes:      Conjunctiva/sclera: Conjunctivae normal.      Pupils: Pupils are equal, round, and reactive to light.   Neck:      Vascular: No carotid bruit.   Cardiovascular:      Rate and Rhythm: Normal rate and regular rhythm.      Pulses: Normal pulses.      Heart sounds: Normal heart sounds. No murmur heard.    No friction rub. No gallop.   Pulmonary:      Effort: Pulmonary effort is normal.      Breath sounds: Normal breath sounds. No wheezing or rhonchi.   Skin:     General: Skin is warm and dry.   Neurological:      Mental Status: She is alert and oriented to person, place, and time.      Cranial Nerves: No cranial nerve deficit.   Psychiatric:         Mood and Affect: Mood and affect normal.         Behavior: Behavior normal.         Thought Content: Thought content normal.         Judgment: Judgment normal.          Result Review        No Images in the past 120 days found..      The above data has been reviewed by JEFF Patel 02/13/2023 07:24 EST.          Patient Care Team:  Christal Lim APRN as PCP - General (Family Medicine)  Amanda Orona MD as Consulting Physician (Urology)    Class 2 Severe Obesity (BMI >=35 and <=39.9). Obesity-related health conditions include the following: none. Obesity is unchanged. BMI is is above average; BMI management plan is completed. We discussed low calorie, low carb based diet program, portion control and increasing exercise.       ASSESSMENT & PLAN    Diagnoses and  all orders for this visit:    1. Anxiety (Primary)  Comments:  Doing well on Prozac, continue medication  Orders:  -     FLUoxetine (PROzac) 10 MG capsule; Take 1 capsule by mouth Every Night.  Dispense: 90 capsule; Refill: 1    2. Depression, unspecified depression type  Comments:  Doing well on Prozac, continue medication  Orders:  -     FLUoxetine (PROzac) 10 MG capsule; Take 1 capsule by mouth Every Night.  Dispense: 90 capsule; Refill: 1    3. Uncomplicated asthma, unspecified asthma severity, unspecified whether persistent  Comments:  Symptoms well controlled with current medication regimen, cont. Current meds.  Orders:  -     Fluticasone Furoate-Vilanterol (Breo Ellipta) 200-25 MCG/ACT inhaler; Inhale 1 puff Daily.  Dispense: 3 each; Refill: 1  -     albuterol sulfate  (90 Base) MCG/ACT inhaler; Inhale 2 puffs Every 6 (Six) Hours As Needed for Wheezing.  Dispense: 18 g; Refill: 1  -     montelukast (SINGULAIR) 10 MG tablet; Take 1 tablet by mouth Every Night.  Dispense: 90 tablet; Refill: 1    4. Screening for thyroid disorder  -     TSH; Future    5. Encounter for lipid screening for cardiovascular disease  -     Lipid Panel; Future    6. Need for hepatitis C screening test  -     Hepatitis C antibody; Future    7. Routine lab draw  -     Comprehensive Metabolic Panel; Future  -     CBC & Differential; Future    8. Daytime somnolence  Comments:  Rerefer to sleep disorder center  Orders:  -     Ambulatory Referral to Sleep Medicine    9. Attention deficit hyperactivity disorder (ADHD), predominantly inattentive type  Comments:  Doing well on Strattera, continue medication  Orders:  -     atomoxetine (STRATTERA) 60 MG capsule; Take 1 capsule by mouth Daily.  Dispense: 90 capsule; Refill: 1    10. Dyspepsia  Comments:  Check H. pylori lab, consider DISIDA scan  Orders:  -     Helicobacter Pylori, IgA IgG IgM; Future         Tobacco Use: Low Risk    • Smoking Tobacco Use: Never   • Smokeless Tobacco Use:  Never   • Passive Exposure: Not on file       Follow Up     Return in about 6 months (around 8/13/2023).      Patient was given instructions and counseling regarding her condition or for health maintenance advice. Please see specific information pulled into the AVS if appropriate.   I have reviewed information obtained and documented by others and I have confirmed the accuracy of this documented note.    Christal Lim, APRN

## 2023-02-14 LAB
H PYLORI IGA SER-ACNC: <9 UNITS (ref 0–8.9)
H PYLORI IGG SER IA-ACNC: 0.11 INDEX VALUE (ref 0–0.79)
H PYLORI IGM SER-ACNC: <9 UNITS (ref 0–8.9)

## 2023-02-16 DIAGNOSIS — R79.89 ABNORMAL TSH: Primary | ICD-10-CM

## 2023-05-08 ENCOUNTER — LAB (OUTPATIENT)
Dept: LAB | Facility: HOSPITAL | Age: 23
End: 2023-05-08
Payer: COMMERCIAL

## 2023-05-08 DIAGNOSIS — R79.89 ABNORMAL TSH: ICD-10-CM

## 2023-05-08 LAB
T4 FREE SERPL-MCNC: 1.15 NG/DL (ref 0.93–1.7)
TSH SERPL DL<=0.05 MIU/L-ACNC: 2.65 UIU/ML (ref 0.27–4.2)

## 2023-05-08 PROCEDURE — 84443 ASSAY THYROID STIM HORMONE: CPT

## 2023-05-08 PROCEDURE — 84439 ASSAY OF FREE THYROXINE: CPT

## 2023-05-08 PROCEDURE — 36415 COLL VENOUS BLD VENIPUNCTURE: CPT

## 2023-05-15 ENCOUNTER — HOSPITAL ENCOUNTER (EMERGENCY)
Facility: HOSPITAL | Age: 23
Discharge: HOME | End: 2023-05-15
Attending: EMERGENCY MEDICINE | Admitting: EMERGENCY MEDICINE
Payer: COMMERCIAL

## 2023-05-15 ENCOUNTER — APPOINTMENT (OUTPATIENT)
Dept: GENERAL RADIOLOGY | Facility: HOSPITAL | Age: 23
End: 2023-05-15
Payer: COMMERCIAL

## 2023-05-15 VITALS
OXYGEN SATURATION: 98 % | SYSTOLIC BLOOD PRESSURE: 116 MMHG | RESPIRATION RATE: 20 BRPM | HEART RATE: 83 BPM | TEMPERATURE: 98.9 F | HEIGHT: 65 IN | BODY MASS INDEX: 40.32 KG/M2 | WEIGHT: 242 LBS | DIASTOLIC BLOOD PRESSURE: 58 MMHG

## 2023-05-15 DIAGNOSIS — J45.41 MODERATE PERSISTENT ASTHMA WITH EXACERBATION: Primary | ICD-10-CM

## 2023-05-15 LAB
ALBUMIN SERPL-MCNC: 3.9 G/DL (ref 3.5–5.2)
ALBUMIN/GLOB SERPL: 1.2 G/DL
ALP SERPL-CCNC: 78 U/L (ref 39–117)
ALT SERPL W P-5'-P-CCNC: 24 U/L (ref 1–33)
ANION GAP SERPL CALCULATED.3IONS-SCNC: 9.8 MMOL/L (ref 5–15)
AST SERPL-CCNC: 21 U/L (ref 1–32)
BASOPHILS # BLD AUTO: 0.09 10*3/MM3 (ref 0–0.2)
BASOPHILS NFR BLD AUTO: 0.7 % (ref 0–1.5)
BILIRUB SERPL-MCNC: 0.3 MG/DL (ref 0–1.2)
BUN SERPL-MCNC: 7 MG/DL (ref 6–20)
BUN/CREAT SERPL: 10.3 (ref 7–25)
CALCIUM SPEC-SCNC: 9 MG/DL (ref 8.6–10.5)
CHLORIDE SERPL-SCNC: 105 MMOL/L (ref 98–107)
CO2 SERPL-SCNC: 25.2 MMOL/L (ref 22–29)
CREAT SERPL-MCNC: 0.68 MG/DL (ref 0.57–1)
DEPRECATED RDW RBC AUTO: 43.3 FL (ref 37–54)
EGFRCR SERPLBLD CKD-EPI 2021: 124.1 ML/MIN/1.73
EOSINOPHIL # BLD AUTO: 0.72 10*3/MM3 (ref 0–0.4)
EOSINOPHIL NFR BLD AUTO: 5.6 % (ref 0.3–6.2)
ERYTHROCYTE [DISTWIDTH] IN BLOOD BY AUTOMATED COUNT: 14 % (ref 12.3–15.4)
GLOBULIN UR ELPH-MCNC: 3.2 GM/DL
GLUCOSE SERPL-MCNC: 95 MG/DL (ref 65–99)
HCG INTACT+B SERPL-ACNC: <0.5 MIU/ML
HCT VFR BLD AUTO: 38.8 % (ref 34–46.6)
HGB BLD-MCNC: 13.1 G/DL (ref 12–15.9)
HOLD SPECIMEN: NORMAL
HOLD SPECIMEN: NORMAL
IMM GRANULOCYTES # BLD AUTO: 0.04 10*3/MM3 (ref 0–0.05)
IMM GRANULOCYTES NFR BLD AUTO: 0.3 % (ref 0–0.5)
LIPASE SERPL-CCNC: 29 U/L (ref 13–60)
LYMPHOCYTES # BLD AUTO: 2.08 10*3/MM3 (ref 0.7–3.1)
LYMPHOCYTES NFR BLD AUTO: 16 % (ref 19.6–45.3)
MAGNESIUM SERPL-MCNC: 1.9 MG/DL (ref 1.6–2.6)
MCH RBC QN AUTO: 28.9 PG (ref 26.6–33)
MCHC RBC AUTO-ENTMCNC: 33.8 G/DL (ref 31.5–35.7)
MCV RBC AUTO: 85.7 FL (ref 79–97)
MONOCYTES # BLD AUTO: 0.9 10*3/MM3 (ref 0.1–0.9)
MONOCYTES NFR BLD AUTO: 6.9 % (ref 5–12)
NEUTROPHILS NFR BLD AUTO: 70.5 % (ref 42.7–76)
NEUTROPHILS NFR BLD AUTO: 9.13 10*3/MM3 (ref 1.7–7)
NRBC BLD AUTO-RTO: 0 /100 WBC (ref 0–0.2)
NT-PROBNP SERPL-MCNC: 50.9 PG/ML (ref 0–450)
PLATELET # BLD AUTO: 335 10*3/MM3 (ref 140–450)
PMV BLD AUTO: 9.6 FL (ref 6–12)
POTASSIUM SERPL-SCNC: 3.9 MMOL/L (ref 3.5–5.2)
PROT SERPL-MCNC: 7.1 G/DL (ref 6–8.5)
RBC # BLD AUTO: 4.53 10*6/MM3 (ref 3.77–5.28)
SODIUM SERPL-SCNC: 140 MMOL/L (ref 136–145)
TROPONIN T SERPL HS-MCNC: <6 NG/L
WBC NRBC COR # BLD: 12.96 10*3/MM3 (ref 3.4–10.8)
WHOLE BLOOD HOLD COAG: NORMAL
WHOLE BLOOD HOLD SPECIMEN: NORMAL

## 2023-05-15 PROCEDURE — 85025 COMPLETE CBC W/AUTO DIFF WBC: CPT

## 2023-05-15 PROCEDURE — 94799 UNLISTED PULMONARY SVC/PX: CPT

## 2023-05-15 PROCEDURE — 96375 TX/PRO/DX INJ NEW DRUG ADDON: CPT

## 2023-05-15 PROCEDURE — 80053 COMPREHEN METABOLIC PANEL: CPT

## 2023-05-15 PROCEDURE — 71045 X-RAY EXAM CHEST 1 VIEW: CPT

## 2023-05-15 PROCEDURE — 93005 ELECTROCARDIOGRAM TRACING: CPT | Performed by: EMERGENCY MEDICINE

## 2023-05-15 PROCEDURE — 94640 AIRWAY INHALATION TREATMENT: CPT

## 2023-05-15 PROCEDURE — 99284 EMERGENCY DEPT VISIT MOD MDM: CPT

## 2023-05-15 PROCEDURE — 93005 ELECTROCARDIOGRAM TRACING: CPT

## 2023-05-15 PROCEDURE — 84702 CHORIONIC GONADOTROPIN TEST: CPT | Performed by: EMERGENCY MEDICINE

## 2023-05-15 PROCEDURE — 84484 ASSAY OF TROPONIN QUANT: CPT

## 2023-05-15 PROCEDURE — 36415 COLL VENOUS BLD VENIPUNCTURE: CPT

## 2023-05-15 PROCEDURE — 87635 SARS-COV-2 COVID-19 AMP PRB: CPT | Performed by: EMERGENCY MEDICINE

## 2023-05-15 PROCEDURE — 25010000002 ONDANSETRON PER 1 MG: Performed by: EMERGENCY MEDICINE

## 2023-05-15 PROCEDURE — 83880 ASSAY OF NATRIURETIC PEPTIDE: CPT

## 2023-05-15 PROCEDURE — 96374 THER/PROPH/DIAG INJ IV PUSH: CPT

## 2023-05-15 PROCEDURE — 83690 ASSAY OF LIPASE: CPT

## 2023-05-15 PROCEDURE — 25010000002 METHYLPREDNISOLONE PER 125 MG: Performed by: EMERGENCY MEDICINE

## 2023-05-15 PROCEDURE — 83735 ASSAY OF MAGNESIUM: CPT

## 2023-05-15 RX ORDER — LIDOCAINE HYDROCHLORIDE 10 MG/ML
5 INJECTION, SOLUTION EPIDURAL; INFILTRATION; INTRACAUDAL; PERINEURAL ONCE
Status: COMPLETED | OUTPATIENT
Start: 2023-05-15 | End: 2023-05-15

## 2023-05-15 RX ORDER — ASPIRIN 81 MG/1
324 TABLET, CHEWABLE ORAL ONCE
Status: DISCONTINUED | OUTPATIENT
Start: 2023-05-15 | End: 2023-05-15 | Stop reason: HOSPADM

## 2023-05-15 RX ORDER — ONDANSETRON 2 MG/ML
4 INJECTION INTRAMUSCULAR; INTRAVENOUS ONCE
Status: COMPLETED | OUTPATIENT
Start: 2023-05-15 | End: 2023-05-15

## 2023-05-15 RX ORDER — IPRATROPIUM BROMIDE AND ALBUTEROL SULFATE 2.5; .5 MG/3ML; MG/3ML
3 SOLUTION RESPIRATORY (INHALATION)
Status: COMPLETED | OUTPATIENT
Start: 2023-05-15 | End: 2023-05-15

## 2023-05-15 RX ORDER — METHYLPREDNISOLONE SODIUM SUCCINATE 125 MG/2ML
125 INJECTION, POWDER, LYOPHILIZED, FOR SOLUTION INTRAMUSCULAR; INTRAVENOUS ONCE
Status: COMPLETED | OUTPATIENT
Start: 2023-05-15 | End: 2023-05-15

## 2023-05-15 RX ORDER — MONTELUKAST SODIUM 10 MG/1
10 TABLET ORAL NIGHTLY
Qty: 30 TABLET | Refills: 0 | Status: SHIPPED | OUTPATIENT
Start: 2023-05-15 | End: 2023-06-14

## 2023-05-15 RX ORDER — SODIUM CHLORIDE 0.9 % (FLUSH) 0.9 %
10 SYRINGE (ML) INJECTION AS NEEDED
Status: DISCONTINUED | OUTPATIENT
Start: 2023-05-15 | End: 2023-05-15 | Stop reason: HOSPADM

## 2023-05-15 RX ORDER — PREDNISONE 50 MG/1
50 TABLET ORAL DAILY
Qty: 5 TABLET | Refills: 0 | Status: SHIPPED | OUTPATIENT
Start: 2023-05-15 | End: 2023-05-20

## 2023-05-15 RX ORDER — CETIRIZINE HYDROCHLORIDE 10 MG/1
10 TABLET ORAL DAILY
Qty: 30 TABLET | Refills: 0 | Status: SHIPPED | OUTPATIENT
Start: 2023-05-15 | End: 2023-06-14

## 2023-05-15 RX ADMIN — IPRATROPIUM BROMIDE AND ALBUTEROL SULFATE 3 ML: .5; 2.5 SOLUTION RESPIRATORY (INHALATION) at 19:25

## 2023-05-15 RX ADMIN — LIDOCAINE HYDROCHLORIDE 5 ML: 10 INJECTION, SOLUTION EPIDURAL; INFILTRATION; INTRACAUDAL; PERINEURAL at 19:28

## 2023-05-15 RX ADMIN — ONDANSETRON 4 MG: 2 INJECTION INTRAMUSCULAR; INTRAVENOUS at 20:01

## 2023-05-15 RX ADMIN — IPRATROPIUM BROMIDE AND ALBUTEROL SULFATE 3 ML: .5; 2.5 SOLUTION RESPIRATORY (INHALATION) at 19:15

## 2023-05-15 RX ADMIN — IPRATROPIUM BROMIDE AND ALBUTEROL SULFATE 3 ML: .5; 2.5 SOLUTION RESPIRATORY (INHALATION) at 19:20

## 2023-05-15 RX ADMIN — METHYLPREDNISOLONE SODIUM SUCCINATE 125 MG: 125 INJECTION INTRAMUSCULAR; INTRAVENOUS at 19:26

## 2023-05-15 NOTE — ED PROVIDER NOTES
Time: 5:16 PM EDT  Date of encounter:  5/15/2023  Independent Historian/Clinical History and Information was obtained by:   Patient  Chief Complaint   Patient presents with   • Shortness of Breath   • Chest Pain       History is limited by: N/A    History of Present Illness:  Patient is a 23 y.o. year old female who presents to the emergency department for evaluation of shortness of breath x2 days.  Patient states that she has a similar episode about 2 weeks ago whenever she was diagnosed with pneumonia in her left lower lobe.  Patient states that she was seen at a hospital in [place].  The patient states that even though she has a known history of asthma she was only placed on Zithromax.  The patient did not receive any prednisone.   The patient notes chest tightness in her lungs.  Patient admits to  productive cough with clear mucus.  Patient admits to chills and nausea.  Patient denies fever or vomiting.  Patient states that she says feels like her current symptoms are exacerbated due to recent change in homes, patient states there are multiple pets in This home.  The patient does note that she has seasonal allergies.  The patient has not taken anything for allergies.  Patient also notes that she used to be on Spiriva which she does not take.  The patient also notes that she was on Singulair which she did not take and she feels does help quite a bit    HPI    Patient Care Team  Primary Care Provider: Christal Lim APRN    Past Medical History:     No Known Allergies  Past Medical History:   Diagnosis Date   • Asthma    • Depression    • Kidney stone    • Plantar wart    • Seasonal allergies      Past Surgical History:   Procedure Laterality Date   • CYSTOSCOPY URETEROSCOPY Right 10/4/2021    Procedure: CYSTOSCOPY RIGHT URETEROSCOPY, RETROGRADE PYELOGRAM, LASERTRIPSY, STONE BASKET EXTRACTION AND STENT INSERTION;  Surgeon: Amanda Orona MD;  Location: [facility] MAIN OR;  Service: Urology;  Laterality:  Right;   • ENDOSCOPY     • MOUTH SURGERY       Family History   Problem Relation Age of Onset   • Hypertension Mother    • Hypertension Maternal Grandfather    • Kidney cancer Maternal Grandfather        Home Medications:  Prior to Admission medications    Medication Sig Start Date End Date Taking? Authorizing Provider   albuterol sulfate  (90 Base) MCG/ACT inhaler Inhale 2 puffs Every 6 (Six) Hours As Needed for Wheezing. 2/13/23   Christal Lim APRN   atomoxetine (STRATTERA) 60 MG capsule Take 1 capsule by mouth Daily. 2/13/23   Christal Lim APRN   azelastine (ASTELIN) 0.1 % nasal spray 2 sprays into the nostril(s) as directed by provider 2 (Two) Times a Day. Use in each nostril as directed    Kayden Champion MD   FLUoxetine (PROzac) 10 MG capsule Take 1 capsule by mouth Every Night. 2/13/23   Christal Lim APRN   Fluticasone Furoate-Vilanterol (Breo Ellipta) 200-25 MCG/ACT inhaler Inhale 1 puff Daily. 2/13/23   Christal Lim APRN   meloxicam (Mobic) 7.5 MG tablet Take 1 tablet by mouth Daily. 6/7/22   Christal Lim APRN   montelukast (SINGULAIR) 10 MG tablet Take 1 tablet by mouth Every Night. 2/13/23   Christal Lim APRN   Serdexmethylphen-Dexmethylphen (Azstarys) 39.2-7.8 MG capsule Take 1 capsule by mouth As Needed. 11/18/22   Provider, MD Kayden        Social History:   Social History     Tobacco Use   • Smoking status: Never   • Smokeless tobacco: Never   Vaping Use   • Vaping Use: Never used   Substance Use Topics   • Alcohol use: Yes     Comment: occasionally   • Drug use: Never         Review of Systems:  Review of Systems   Constitutional: Positive for chills. Negative for diaphoresis and fever.   HENT: Negative for congestion, postnasal drip, rhinorrhea and sore throat.    Eyes: Negative for photophobia.   Respiratory: Positive for chest tightness and shortness of breath. Negative for cough.    Cardiovascular: Negative for chest pain, palpitations and leg  "swelling.   Gastrointestinal: Positive for nausea. Negative for abdominal pain, diarrhea and vomiting.   Genitourinary: Negative for difficulty urinating, dysuria, flank pain, frequency, hematuria and urgency.   Musculoskeletal: Negative for neck pain and neck stiffness.   Skin: Negative for pallor and rash.   Neurological: Negative for dizziness, syncope, weakness, numbness and headaches.   Hematological: Negative for adenopathy. Does not bruise/bleed easily.   Psychiatric/Behavioral: Negative.         Physical Exam:  /58   Pulse 83   Temp 98.9 °F (37.2 °C) (Oral)   Resp 20   Ht 165.1 cm (65\")   Wt 110 kg (242 lb)   LMP 05/01/2023 (Approximate)   SpO2 98%   BMI 40.27 kg/m²     Physical Exam  Vitals and nursing note reviewed.   Constitutional:       General: She is not in acute distress.     Appearance: Normal appearance. She is obese. She is not ill-appearing, toxic-appearing or diaphoretic.   HENT:      Head: Normocephalic and atraumatic.      Mouth/Throat:      Mouth: Mucous membranes are moist.   Eyes:      Extraocular Movements: Extraocular movements intact.      Pupils: Pupils are equal, round, and reactive to light.   Cardiovascular:      Rate and Rhythm: Normal rate and regular rhythm.      Pulses: Normal pulses.           Carotid pulses are 2+ on the right side and 2+ on the left side.       Radial pulses are 2+ on the right side and 2+ on the left side.        Femoral pulses are 2+ on the right side and 2+ on the left side.       Popliteal pulses are 2+ on the right side and 2+ on the left side.        Dorsalis pedis pulses are 2+ on the right side and 2+ on the left side.        Posterior tibial pulses are 2+ on the right side and 2+ on the left side.      Heart sounds: Normal heart sounds. No murmur heard.  Pulmonary:      Effort: Pulmonary effort is normal. No tachypnea, accessory muscle usage, respiratory distress or retractions.      Breath sounds: Examination of the right-upper field " reveals wheezing. Examination of the left-upper field reveals wheezing. Examination of the right-middle field reveals decreased breath sounds. Examination of the left-middle field reveals decreased breath sounds. Examination of the right-lower field reveals decreased breath sounds. Examination of the left-lower field reveals decreased breath sounds. Decreased breath sounds and wheezing present. No rhonchi or rales.   Abdominal:      General: Abdomen is flat. There is no distension.      Palpations: Abdomen is soft. There is no mass or pulsatile mass.      Tenderness: There is no abdominal tenderness. There is no right CVA tenderness, left CVA tenderness, guarding or rebound.      Comments: No rigidity   Musculoskeletal:         General: No swelling, tenderness or deformity.      Cervical back: Normal range of motion and neck supple. No tenderness.      Right lower leg: No tenderness. No edema.      Left lower leg: No tenderness. No edema.   Skin:     General: Skin is warm and dry.      Capillary Refill: Capillary refill takes less than 2 seconds.      Coloration: Skin is not jaundiced or pale.      Findings: No erythema.   Neurological:      General: No focal deficit present.      Mental Status: She is alert and oriented to person, place, and time. Mental status is at baseline.      Cranial Nerves: Cranial nerves 2-12 are intact. No cranial nerve deficit.      Sensory: Sensation is intact. No sensory deficit.      Motor: Motor function is intact. No weakness or pronator drift.      Coordination: Coordination is intact. Coordination normal.   Psychiatric:         Mood and Affect: Mood normal.         Behavior: Behavior normal.                  Procedures:  Procedures      Medical Decision Making:      Comorbidities that affect care:    Asthma, seasonal allergies    External Notes reviewed:    None      The following orders were placed and all results were independently analyzed by me:  Orders Placed This Encounter    Procedures   • COVID-19,APTIMA PANTHER(SANNA),[facility], NP/OP SWAB IN UTM/VTM/SALINE TRANSPORT MEDIA,24 HR TAT - Swab, Nasal Cavity   • XR Chest 1 View   • [place] Draw   • High Sensitivity Troponin T   • Comprehensive Metabolic Panel   • Lipase   • BNP   • Magnesium   • CBC Auto Differential   • hCG, Quantitative, Pregnancy   • Undress & Gown   • Continuous Pulse Oximetry   • CBC & Differential   • Green Top (Gel)   • Lavender Top   • Gold Top - SST   • Light Blue Top       Medications Given in the Emergency Department:  Medications   ipratropium-albuterol (DUO-NEB) nebulizer solution 3 mL (3 mL Nebulization Given 5/15/23 1925)   lidocaine PF 1% (XYLOCAINE) injection 5 mL (5 mL Nebulization Given 5/15/23 1928)   methylPREDNISolone sodium succinate (SOLU-Medrol) injection 125 mg (125 mg Intravenous Given 5/15/23 1926)   ondansetron (ZOFRAN) injection 4 mg (4 mg Intravenous Given 5/15/23 2001)        ED Course:    The patient was initially evaluated in the triage area where orders were placed. The patient was later dispositioned by Thai Burleson DO.      The patient was advised to stay for completion of workup which includes but is not limited to communication of labs and radiological results, reassessment and plan. The patient was advised that leaving prior to disposition by a provider could result in critical findings that are not communicated to the patient.     ED Course as of 05/16/23 0339   Mon May 15, 2023   1716 --- PROVIDER IN TRIAGE NOTE ---    The patient was evaluated by me, Yusra Shabbir in triage. Orders were placed and the patient is currently awaiting disposition.   [YS]      ED Course User Index  [YS] Shabbir, Yusra, PA       Labs:    Lab Results (last 24 hours)     Procedure Component Value Units Date/Time    High Sensitivity Troponin T [[id number]]  (Normal) Collected: 05/15/23 1726    Specimen: Blood Updated: 05/15/23 1754     HS Troponin T <6 ng/L     Narrative:      High Sensitive Troponin  T Reference Range:  <10.0 ng/L- Negative Female for AMI  <15.0 ng/L- Negative Male for AMI  >=10 - Abnormal Female indicating possible myocardial injury.  >=15 - Abnormal Male indicating possible myocardial injury.   Clinicians would have to utilize clinical acumen, EKG, Troponin, and serial changes to determine if it is an Acute Myocardial Infarction or myocardial injury due to an underlying chronic condition.         CBC & Differential [[id number]]  (Abnormal) Collected: 05/15/23 1726    Specimen: Blood Updated: 05/15/23 1735    Narrative:      The following orders were created for panel order CBC & Differential.  Procedure                               Abnormality         Status                     ---------                               -----------         ------                     CBC Auto Differential[[id number]]        Abnormal            Final result                 Please view results for these tests on the individual orders.    Comprehensive Metabolic Panel [[id number]] Collected: 05/15/23 1726    Specimen: Blood Updated: 05/15/23 1754     Glucose 95 mg/dL      BUN 7 mg/dL      Creatinine 0.68 mg/dL      Sodium 140 mmol/L      Potassium 3.9 mmol/L      Comment: Slight hemolysis detected by analyzer. Results may be affected.        Chloride 105 mmol/L      CO2 25.2 mmol/L      Calcium 9.0 mg/dL      Total Protein 7.1 g/dL      Albumin 3.9 g/dL      ALT (SGPT) 24 U/L      AST (SGOT) 21 U/L      Alkaline Phosphatase 78 U/L      Total Bilirubin 0.3 mg/dL      Globulin 3.2 gm/dL      A/G Ratio 1.2 g/dL      BUN/Creatinine Ratio 10.3     Anion Gap 9.8 mmol/L      eGFR 124.1 mL/min/1.73     Narrative:      GFR Normal >60  Chronic Kidney Disease <60  Kidney Failure <15      Lipase [[id number]]  (Normal) Collected: 05/15/23 1726    Specimen: Blood Updated: 05/15/23 1754     Lipase 29 U/L     BNP [[id number]]  (Normal) Collected: 05/15/23 1726    Specimen: Blood Updated: 05/15/23 1752     proBNP 50.9 pg/mL      Narrative:      Among patients with dyspnea, NT-proBNP is highly sensitive for the detection of acute congestive heart failure. In addition NT-proBNP of <300 pg/ml effectively rules out acute congestive heart failure with 99% negative predictive value.      Magnesium [[id number]]  (Normal) Collected: 05/15/23 1726    Specimen: Blood Updated: 05/15/23 1754     Magnesium 1.9 mg/dL     CBC Auto Differential [[id number]]  (Abnormal) Collected: 05/15/23 1726    Specimen: Blood Updated: 05/15/23 1735     WBC 12.96 10*3/mm3      RBC 4.53 10*6/mm3      Hemoglobin 13.1 g/dL      Hematocrit 38.8 %      MCV 85.7 fL      MCH 28.9 pg      MCHC 33.8 g/dL      RDW 14.0 %      RDW-SD 43.3 fl      MPV 9.6 fL      Platelets 335 10*3/mm3      Neutrophil % 70.5 %      Lymphocyte % 16.0 %      Monocyte % 6.9 %      Eosinophil % 5.6 %      Basophil % 0.7 %      Immature Grans % 0.3 %      Neutrophils, Absolute 9.13 10*3/mm3      Lymphocytes, Absolute 2.08 10*3/mm3      Monocytes, Absolute 0.90 10*3/mm3      Eosinophils, Absolute 0.72 10*3/mm3      Basophils, Absolute 0.09 10*3/mm3      Immature Grans, Absolute 0.04 10*3/mm3      nRBC 0.0 /100 WBC     hCG, Quantitative, Pregnancy [[id number]] Collected: 05/15/23 1726    Specimen: Blood Updated: 05/15/23 1925     HCG Quantitative <0.50 mIU/mL     Narrative:      HCG Ranges by Gestational Age    Females - non-pregnant premenopausal   </= 1mIU/mL HCG  Females - postmenopausal               </= 7mIU/mL HCG    3 Weeks       5.4   -      72 mIU/mL  4 Weeks      10.2   -     708 mIU/mL  5 Weeks       217   -   8,245 mIU/mL  6 Weeks       152   -  32,177 mIU/mL  7 Weeks     4,059   - 153,767 mIU/mL  8 Weeks    31,366   - 149,094 mIU/mL  9 Weeks    59,109   - 135,901 mIU/mL  10 Weeks   44,186   - 170,409 mIU/mL  12 Weeks   27,107   - 201,615 mIU/mL  14 Weeks   24,302   -  93,646 mIU/mL  15 Weeks   12,540   -  69,747 mIU/mL  16 Weeks    8,904   -  55,332 mIU/mL  17 Weeks    8,240   -  51,793  mIU/mL  18 Weeks    9,649   -  55,271 mIU/mL      COVID-19,APTIMA PANTHER(SANNA),[facility], NP/OP SWAB IN UTM/VTM/SALINE TRANSPORT MEDIA,24 HR TAT - Swab, Nasal Cavity [[id number]]  (Normal) Collected: 05/15/23 2003    Specimen: Swab from Nasal Cavity Updated: 05/16/23 0057     COVID19 Not Detected    Narrative:      Fact sheet for providers: https://www.fda.gov/media/245749/download     Fact sheet for patients: https://www.fda.gov/media/171427/download    Test performed by RT PCR.           Imaging:    XR Chest 1 View    Result Date: 5/15/2023  PROCEDURE: XR CHEST 1 VW  COMPARISON: [facility], , CHEST AP/PA 1 VIEW, 2/09/2020, 9:11.  INDICATIONS: Shortness of breath  FINDINGS:  The heart is not enlarged.  The lungs seem clear.  There are no pleural effusions.        1. An acute pulmonary process is not apparent.       MARIA D BOOTH MD       Electronically Signed and Approved By: MARIA D BOOTH MD on 5/15/2023 at 17:14                 Differential Diagnosis and Discussion:      Dyspnea: Differential diagnosis includes but is not limited to metabolic acidosis, neurological disorders, psychogenic, asthma, pneumothorax, upper airway obstruction, COPD, pneumonia, noncardiogenic pulmonary edema, interstitial lung disease, anemia, congestive heart failure, and pulmonary embolism    All labs were reviewed and interpreted by me.  All X-rays impressions were independently interpreted by me.    MDM  Number of Diagnoses or Management Options  Moderate persistent asthma with exacerbation  Diagnosis management comments:     Patient had decreased breath sounds in the bases and wheezing upon my initial evaluation.  The patient was in no respiratory distress.  The patient had a normal room air pulse ox.  The patient was given 3 DuoNebs and Solu-Medrol..  The patient was reassessed after the DuoNeb's.  The patient subjectively states that she feels much better.  On reauscultation.  The patient's lung sounds were  dramatically improved.  She just had faint wheezes in the bases which initially had really no breath sounds the patient was in no respiratory distress including no accessory muscle use, retractions or tachypnea.  The patient's room air pulse ox was normal.  The patient states he feels better and like to go home    The patient will use her nebulizer at home every 4-6 hours.  The patient will be placed on prednisone.  I will refill the patient's Singulair..  The patient will be referred to pulmonology    The patient was given very specific instructions on when and why to return to the emergency room.  The patient voiced understanding and felt comfortable with the discharge instructions.  They would return to the emergency room if necessary.  The patient appears appropriate for discharge and outpatient follow-up.         Amount and/or Complexity of Data Reviewed  Clinical lab tests: reviewed  Tests in the radiology section of CPT®: reviewed  Tests in the medicine section of CPT®: reviewed                 Social Determinants of Health:    Patient is independent, reliable, and has access to care.       Disposition and Care Coordination:    Discharged: The patient is suitable and stable for discharge with no need for consideration of observation or admission.    I have explained discharge medications and the need for follow up with the patient/caretakers. This was also printed in the discharge instructions. Patient was discharged with the following medications and follow up:      Medication List      New Prescriptions    cetirizine 10 MG tablet  Commonly known as: zyrTEC  Take 1 tablet by mouth Daily for 30 days.     predniSONE 50 MG tablet  Commonly known as: DELTASONE  Take 1 tablet by mouth Daily for 5 days.           Where to Get Your Medications      These medications were sent to [facility] [id number]  [place] - [address] [facility] [address] - [phone] [facility] [phone]   [address], [facility] [address]    Phone: [phone]   · cetirizine 10 MG tablet  · montelukast 10 MG tablet  · predniSONE 50 MG tablet      Christal Lim, APRN  [address]  [address]  [facility] [address]  [phone]    On 5/17/2023  Asthma exacerbation, call for appointment    Tarik Wlyie, DO  [address]  [address]  [place] [id number]  [phone]    On 5/17/2023  Asthma evaluation, call for appointment       Final diagnoses:   Moderate persistent asthma with exacerbation        ED Disposition     ED Disposition   Discharge    Condition   Stable    Comment   --             This medical record created using voice recognition software.           Thai Burleson DO  05/16/23 0339

## 2023-05-16 LAB — SARS-COV-2 RNA RESP QL NAA+PROBE: NOT DETECTED

## 2023-05-16 NOTE — DISCHARGE INSTRUCTIONS
Please use your albuterol nebulizer or inhaler every 4-6 hours for shortness of breath and wheezing    No strenuous activity until released by your doctor.  Please return to the emergency room for chest pain, radiating chest pain, worsening shortness of breath, near passing out, passing out, extreme fatigue, extreme sweating, nausea or vomiting or new or worrisome symptoms

## 2023-05-17 LAB — QT INTERVAL: 341 MS

## 2023-05-25 ENCOUNTER — OFFICE VISIT (OUTPATIENT)
Dept: SLEEP MEDICINE | Facility: HOSPITAL | Age: 23
End: 2023-05-25
Payer: COMMERCIAL

## 2023-05-25 VITALS
HEART RATE: 84 BPM | BODY MASS INDEX: 40.97 KG/M2 | SYSTOLIC BLOOD PRESSURE: 122 MMHG | WEIGHT: 245.9 LBS | OXYGEN SATURATION: 99 % | DIASTOLIC BLOOD PRESSURE: 69 MMHG | HEIGHT: 65 IN

## 2023-05-25 DIAGNOSIS — G47.10 HYPERSOMNIA: Primary | ICD-10-CM

## 2023-05-25 PROCEDURE — G0463 HOSPITAL OUTPT CLINIC VISIT: HCPCS

## 2023-05-25 NOTE — PROGRESS NOTES
CONSULT NOTE    Patient Identification:  Sylvia Oliva  23 y.o.  female  2000  [id number]            Requesting physician: Christal Akbar    Reason for Consultation: Hypersomnia    CC:     History of Present Illness:  Very pleasant female she actually had a sleep study done 2016 have a copy of it.  Essentially it was negative for significant DARRIUS but patient was apparently borderline for her MSLT.  Currently she reports excessively sleepy during the daytime.  She is also sleeping too long and falling asleep quickly.  Snoring was reported but no clear history of witnessed apnea.  Unrested in the morning sleepy tired as the day progresses.  She has been on modafinil and some stimulants also.  Occasional alcohol use.  No parasomnia such as sleepwalking sleep talking reported.  Occasional restless leg symptoms reported.  Goes to bed 10 PM gets up at 9 AM gets about 10+ hours of sleep and feels tired.  Weight loss of 15 pounds reported.      Review of Systems  Positive for anxiety depression  [place] 18 out of 24 consistent with sleepiness  Past Medical History:  Past Medical History:   Diagnosis Date   • ADHD (attention deficit hyperactivity disorder)    • Anxiety    • Asthma    • Depression    • Kidney stone    • Plantar wart    • Seasonal allergies        Past Surgical History:  Past Surgical History:   Procedure Laterality Date   • CYSTOSCOPY URETEROSCOPY Right 10/4/2021    Procedure: CYSTOSCOPY RIGHT URETEROSCOPY, RETROGRADE PYELOGRAM, LASERTRIPSY, STONE BASKET EXTRACTION AND STENT INSERTION;  Surgeon: Amanda Orona MD;  Location: [facility];  Service: Urology;  Laterality: Right;   • ENDOSCOPY     • MOUTH SURGERY          Home Meds:  (Not in a hospital admission)      Allergies:  No Known Allergies    Social History:   Social History     Socioeconomic History   • Marital status: Single   Tobacco Use   • Smoking status: Never   • Smokeless tobacco: Never   Vaping Use   • Vaping Use: Never used  "  Substance and Sexual Activity   • Alcohol use: Yes     Comment: occasionally   • Drug use: Never   • Sexual activity: Defer       Family History:  Family History   Problem Relation Age of Onset   • Hypertension Mother    • Hypertension Maternal Grandfather    • Kidney cancer Maternal Grandfather        Physical Exam:  /69   Pulse 84   Ht 165.1 cm (65\")   Wt 112 kg (245 lb 14.4 oz)   LMP 05/01/2023 (Approximate)   SpO2 99%   BMI 40.92 kg/m²  Body mass index is 40.92 kg/m². 99% 112 kg (245 lb 14.4 oz)  Physical Exam  Patient is examined using the personal protective equipment as per guidelines from infection control for this particular patient as enacted.  Hand hygiene was performed before and after patient interaction.  Well-developed normal body habitus  Eyes normal conjunctivae and pupils reactive to light  ENT Mallampati between 1 and 2 normal nasal exam  Neck midline trachea no thyromegaly  Chest no labored breathing clear  CVS regular rate and rhythm no lower extremity edema  Abdomen soft nontender no hepatosplenomegaly  CNS intact normal sensory exam  Skin no rashes no nodules  Psych oriented to time place and person normal memory  Musculoskeletal no cyanosis no clubbing normal range of motion        LABS:  Lab Results   Component Value Date    CALCIUM 9.0 05/15/2023       Lab Results   Component Value Date    TROPONINT <6 05/15/2023                                 Lab Results   Component Value Date    TSH 2.650 05/08/2023     Estimated Creatinine Clearance: 157.7 mL/min (by C-G formula based on SCr of 0.68 mg/dL).         Imaging: I personally visualized the images of scans/x-rays performed within last 3 days.      Assessment:  Hypersomnia  ADHD  Asthma  Depression        Recommendations:  At this time we have a young female with severe hypersomnia previous sleep study negative for significant DARRIUS although her current clinical symptoms suggestive of probable DARRIUS versus narcolepsy  Discussed " pathophysiology consequence of untreated sleep apnea.  Patient agreeable wishing to proceed for a NPSG and if negative will proceed with MSLT to evaluate for narcolepsy  Have asked her to stay off her stimulants for at least 1 week prior to her study  Sleep hygiene measures discussed in detail  Treatment of underlying comorbidities  We will follow-up after NPSG/MSLT to make further recommendations              Kaiser Mace MD  5/25/2023  14:26 EDT      Much of this encounter note is an electronic transcription/translation of spoken language to printed text using Dragon Software.

## 2023-08-14 ENCOUNTER — OFFICE VISIT (OUTPATIENT)
Dept: FAMILY MEDICINE CLINIC | Facility: CLINIC | Age: 23
End: 2023-08-14
Payer: MEDICAID

## 2023-08-14 VITALS
HEIGHT: 65 IN | HEART RATE: 85 BPM | SYSTOLIC BLOOD PRESSURE: 122 MMHG | DIASTOLIC BLOOD PRESSURE: 72 MMHG | TEMPERATURE: 98.3 F | BODY MASS INDEX: 41.15 KG/M2 | OXYGEN SATURATION: 97 % | WEIGHT: 247 LBS

## 2023-08-14 DIAGNOSIS — M54.9 BACK PAIN, UNSPECIFIED BACK LOCATION, UNSPECIFIED BACK PAIN LATERALITY, UNSPECIFIED CHRONICITY: ICD-10-CM

## 2023-08-14 DIAGNOSIS — F41.9 ANXIETY: ICD-10-CM

## 2023-08-14 DIAGNOSIS — J30.2 SEASONAL ALLERGIC RHINITIS, UNSPECIFIED TRIGGER: ICD-10-CM

## 2023-08-14 DIAGNOSIS — F90.0 ATTENTION DEFICIT HYPERACTIVITY DISORDER (ADHD), PREDOMINANTLY INATTENTIVE TYPE: Primary | ICD-10-CM

## 2023-08-14 PROCEDURE — 99214 OFFICE O/P EST MOD 30 MIN: CPT | Performed by: NURSE PRACTITIONER

## 2023-08-14 RX ORDER — MELOXICAM 7.5 MG/1
7.5 TABLET ORAL DAILY
Qty: 30 TABLET | Refills: 5 | Status: SHIPPED | OUTPATIENT
Start: 2023-08-14

## 2023-08-14 RX ORDER — FLUTICASONE FUROATE AND VILANTEROL 100; 25 UG/1; UG/1
1 POWDER RESPIRATORY (INHALATION)
COMMUNITY
End: 2023-08-17 | Stop reason: SDUPTHER

## 2023-08-14 RX ORDER — ATOMOXETINE 60 MG/1
60 CAPSULE ORAL DAILY
Qty: 30 CAPSULE | Refills: 5 | Status: SHIPPED | OUTPATIENT
Start: 2023-08-14

## 2023-08-14 RX ORDER — HYDROXYZINE HYDROCHLORIDE 25 MG/1
25 TABLET, FILM COATED ORAL 3 TIMES DAILY PRN
Qty: 90 TABLET | Refills: 1 | Status: SHIPPED | OUTPATIENT
Start: 2023-08-14

## 2023-08-14 NOTE — PROGRESS NOTES
Chief Complaint    Follow-up (6 months), Anxiety, Depression, ADD, Sleep Disorder, Allergies, and Asthma    SUBJECTIVE  Sylvia Oliva presents to [facility]        Anxiety/Depression:  Patient stopped taking Prozac due to having increased suicidal ideations.  Patient would like to discuss alternative.  Patient denies suicidal ideations or thoughts of harming herself or others.  Patient is no longer seeing psych due to insurance.    Attention Deficit Disorder:  Patient is taking Straterra, with good control of symptoms.  Patient states she is able to concentrate and focus on tasks.  Patient denies any side effects from medication.    Sleep Disorder:  Patient is taking Azstarys. She is having a repeat sleep study scheduled tomorrow.    Seasonal Allergies/Asthma:  Patient is taking Singulair, Azelastine, Zyrtec, Breo Ellipta, Albuterol HFA PRN, with good control of symptoms.      History of Present Illness  Past Medical History:   Diagnosis Date    ADHD (attention deficit hyperactivity disorder)     Anxiety     Asthma     Depression     Kidney stone     Plantar wart     Seasonal allergies       Family History   Problem Relation Age of Onset    Hypertension Mother     Hypertension Maternal Grandfather     Kidney cancer Maternal Grandfather       Past Surgical History:   Procedure Laterality Date    CYSTOSCOPY URETEROSCOPY Right 10/4/2021    Procedure: CYSTOSCOPY RIGHT URETEROSCOPY, RETROGRADE PYELOGRAM, LASERTRIPSY, STONE BASKET EXTRACTION AND STENT INSERTION;  Surgeon: Amanda Orona MD;  Location: [facility] OR;  Service: Urology;  Laterality: Right;    ENDOSCOPY      MOUTH SURGERY          Current Outpatient Medications:     albuterol sulfate  (90 Base) MCG/ACT inhaler, Inhale 2 puffs Every 6 (Six) Hours As Needed for Wheezing., Disp: 18 g, Rfl: 1    atomoxetine (STRATTERA) 60 MG capsule, Take 1 capsule by mouth Daily., Disp: 30 capsule, Rfl: 5    azelastine (ASTELIN)  "0.1 % nasal spray, 2 sprays into the nostril(s) as directed by provider 2 (Two) Times a Day. Use in each nostril as directed, Disp: , Rfl:     cetirizine (zyrTEC) 10 MG tablet, Take 1 tablet by mouth Daily for 30 days., Disp: 30 tablet, Rfl: 0    Fluticasone Furoate-Vilanterol 100-25 MCG/ACT aerosol powder , Inhale 1 puff Daily., Disp: , Rfl:     meloxicam (Mobic) 7.5 MG tablet, Take 1 tablet by mouth Daily., Disp: 30 tablet, Rfl: 5    montelukast (SINGULAIR) 10 MG tablet, Take 1 tablet by mouth Every Night for 30 days., Disp: 30 tablet, Rfl: 0    Serdexmethylphen-Dexmethylphen (Azstarys) 39.2-7.8 MG capsule, Take 1 capsule by mouth As Needed., Disp: , Rfl:     hydrOXYzine (ATARAX) 25 MG tablet, Take 1 tablet by mouth 3 (Three) Times a Day As Needed for Itching., Disp: 90 tablet, Rfl: 1    OBJECTIVE  Vital Signs:   /72 (BP Location: Left arm, Patient Position: Sitting, Cuff Size: Large Adult)   Pulse 85   Temp 98.3 øF (36.8 øC) (Oral)   Ht 165.1 cm (65\")   Wt 112 kg (247 lb)   SpO2 97%   BMI 41.10 kg/mý    Estimated body mass index is 41.1 kg/mý as calculated from the following:    Height as of this encounter: 165.1 cm (65\").    Weight as of this encounter: 112 kg (247 lb).     Wt Readings from Last 3 Encounters:   08/14/23 112 kg (247 lb)   05/25/23 112 kg (245 lb 14.4 oz)   05/15/23 110 kg (242 lb)     BP Readings from Last 3 Encounters:   08/14/23 122/72   05/25/23 122/69   05/15/23 116/58       Physical Exam  Vitals reviewed.   Constitutional:       Appearance: Normal appearance. She is well-developed.   HENT:      Head: Normocephalic and atraumatic.      Right Ear: External ear normal.      Left Ear: External ear normal.      Mouth/Throat:      Pharynx: No oropharyngeal exudate.   Eyes:      Conjunctiva/sclera: Conjunctivae normal.      Pupils: Pupils are equal, round, and reactive to light.   Neck:      Vascular: No carotid bruit.   Cardiovascular:      Rate and Rhythm: Normal rate and regular " rhythm.      Pulses: Normal pulses.      Heart sounds: Normal heart sounds. No murmur heard.    No friction rub. No gallop.   Pulmonary:      Effort: Pulmonary effort is normal.      Breath sounds: Normal breath sounds. No wheezing or rhonchi.   Skin:     General: Skin is warm and dry.   Neurological:      Mental Status: She is alert and oriented to person, place, and time.      Cranial Nerves: No cranial nerve deficit.   Psychiatric:         Mood and Affect: Mood and affect normal.         Behavior: Behavior normal.         Thought Content: Thought content normal.         Judgment: Judgment normal.        Result Review        XR Chest 1 View    Result Date: 5/15/2023    1. An acute pulmonary process is not apparent.       MARIA D BOOTH MD       Electronically Signed and Approved By: MARIA D BOOTH MD on 5/15/2023 at 17:14                 The above data has been reviewed by JEFF Patel 08/14/2023 09:51 EDT.          Patient Care Team:  Christal Lim APRN as PCP - General (Family Medicine)  Amanda Orona MD as Consulting Physician (Urology)            ASSESSMENT & PLAN    Diagnoses and all orders for this visit:    1. Attention deficit hyperactivity disorder (ADHD), predominantly inattentive type (Primary)  Comments:  Doing well on Strattera, continue medication  Orders:  -     atomoxetine (STRATTERA) 60 MG capsule; Take 1 capsule by mouth Daily.  Dispense: 30 capsule; Refill: 5    2. Back pain, unspecified back location, unspecified back pain laterality, unspecified chronicity  Comments:  pain controlled with meloxicam prn  Orders:  -     meloxicam (Mobic) 7.5 MG tablet; Take 1 tablet by mouth Daily.  Dispense: 30 tablet; Refill: 5    3. Seasonal allergic rhinitis, unspecified trigger  Comments:  Symptoms well controlled with current medication regimen, cont. Current meds.    4. Anxiety  Comments:  Trial of hydroxyzine, side effects administration addressed.  Patient will make appointment once she  has insurance coverage and we will do [employer] probe.  Orders:  -     hydrOXYzine (ATARAX) 25 MG tablet; Take 1 tablet by mouth 3 (Three) Times a Day As Needed for Itching.  Dispense: 90 tablet; Refill: 1         Tobacco Use: Low Risk     Smoking Tobacco Use: Never    Smokeless Tobacco Use: Never    Passive Exposure: Not on file       Follow Up     Return for Annual physical.      Patient was given instructions and counseling regarding her condition or for health maintenance advice. Please see specific information pulled into the AVS if appropriate.   I have reviewed information obtained and documented by others and I have confirmed the accuracy of this documented note.    JEFF Patel

## 2023-08-15 ENCOUNTER — HOSPITAL ENCOUNTER (OUTPATIENT)
Dept: SLEEP MEDICINE | Facility: HOSPITAL | Age: 23
Discharge: HOME | End: 2023-08-15
Admitting: INTERNAL MEDICINE

## 2023-08-15 DIAGNOSIS — G47.10 HYPERSOMNIA: ICD-10-CM

## 2023-08-15 PROCEDURE — 95810 POLYSOM 6/> YRS 4/> PARAM: CPT

## 2023-08-16 ENCOUNTER — HOSPITAL ENCOUNTER (OUTPATIENT)
Dept: SLEEP MEDICINE | Facility: HOSPITAL | Age: 23
Discharge: HOME | End: 2023-08-16
Admitting: INTERNAL MEDICINE

## 2023-08-16 ENCOUNTER — LAB (OUTPATIENT)
Dept: LAB | Facility: HOSPITAL | Age: 23
End: 2023-08-16

## 2023-08-16 DIAGNOSIS — G47.10 HYPERSOMNIA: ICD-10-CM

## 2023-08-16 LAB
AMPHET+METHAMPHET UR QL: NEGATIVE
BARBITURATES UR QL SCN: NEGATIVE
BENZODIAZ UR QL SCN: NEGATIVE
CANNABINOIDS SERPL QL: NEGATIVE
COCAINE UR QL: NEGATIVE
FENTANYL UR-MCNC: NEGATIVE NG/ML
METHADONE UR QL SCN: NEGATIVE
OPIATES UR QL: NEGATIVE
OXYCODONE UR QL SCN: NEGATIVE

## 2023-08-16 PROCEDURE — 80307 DRUG TEST PRSMV CHEM ANLYZR: CPT

## 2023-08-16 PROCEDURE — 95805 MULTIPLE SLEEP LATENCY TEST: CPT

## 2023-08-17 ENCOUNTER — TELEPHONE (OUTPATIENT)
Dept: SLEEP MEDICINE | Facility: HOSPITAL | Age: 23
End: 2023-08-17

## 2023-08-17 ENCOUNTER — OFFICE VISIT (OUTPATIENT)
Dept: PULMONOLOGY | Facility: CLINIC | Age: 23
End: 2023-08-17
Payer: MEDICAID

## 2023-08-17 VITALS
WEIGHT: 246.3 LBS | TEMPERATURE: 98.2 F | RESPIRATION RATE: 18 BRPM | BODY MASS INDEX: 41.04 KG/M2 | DIASTOLIC BLOOD PRESSURE: 65 MMHG | HEIGHT: 65 IN | OXYGEN SATURATION: 97 % | HEART RATE: 82 BPM | SYSTOLIC BLOOD PRESSURE: 117 MMHG

## 2023-08-17 DIAGNOSIS — J45.909 UNCOMPLICATED ASTHMA, UNSPECIFIED ASTHMA SEVERITY, UNSPECIFIED WHETHER PERSISTENT: ICD-10-CM

## 2023-08-17 DIAGNOSIS — J30.2 SEASONAL ALLERGIC RHINITIS, UNSPECIFIED TRIGGER: Primary | ICD-10-CM

## 2023-08-17 RX ORDER — ALBUTEROL SULFATE 90 UG/1
2 AEROSOL, METERED RESPIRATORY (INHALATION) EVERY 6 HOURS PRN
Qty: 18 G | Refills: 1 | Status: SHIPPED | OUTPATIENT
Start: 2023-08-17

## 2023-08-17 RX ORDER — AZELASTINE 1 MG/ML
2 SPRAY, METERED NASAL 2 TIMES DAILY
Qty: 1 EACH | Refills: 11 | Status: SHIPPED | OUTPATIENT
Start: 2023-08-17

## 2023-08-17 RX ORDER — MONTELUKAST SODIUM 10 MG/1
10 TABLET ORAL NIGHTLY
Qty: 30 TABLET | Refills: 0 | Status: SHIPPED | OUTPATIENT
Start: 2023-08-17 | End: 2023-09-16

## 2023-08-17 RX ORDER — PREDNISONE 20 MG/1
40 TABLET ORAL DAILY
Qty: 14 TABLET | Refills: 1 | Status: SHIPPED | OUTPATIENT
Start: 2023-08-17

## 2023-08-17 RX ORDER — FLUTICASONE FUROATE, UMECLIDINIUM BROMIDE AND VILANTEROL TRIFENATATE 100; 62.5; 25 UG/1; UG/1; UG/1
1 POWDER RESPIRATORY (INHALATION)
Qty: 1 EACH | Refills: 0 | COMMUNITY
Start: 2023-08-17 | End: 2023-08-18

## 2023-08-17 RX ORDER — CETIRIZINE HYDROCHLORIDE 10 MG/1
10 TABLET ORAL DAILY
Qty: 30 TABLET | Refills: 0 | Status: SHIPPED | OUTPATIENT
Start: 2023-08-17 | End: 2023-09-16

## 2023-08-17 RX ORDER — FLUTICASONE FUROATE AND VILANTEROL 100; 25 UG/1; UG/1
1 POWDER RESPIRATORY (INHALATION)
Qty: 1 EACH | Refills: 4 | Status: SHIPPED | OUTPATIENT
Start: 2023-08-17

## 2023-08-17 NOTE — PROGRESS NOTES
CHIEF COMPLAINT    Primary Care Provider  Christal Lim APRN     Referring Provider  No ref. provider found    Patient Complaint  Asthma, Cough, Shortness of Breath, and Establish Care (New pt here for Asthma/ SOB, Wheezing)        Subjective          Sylvia Oliva presents to [facility]  History of Present Illness  Sylvia Oliva is a 23 y.o. female who has moderate persistent asthma.  She was referred to me by ER provider.  She had 2 hospitalizations in 2020, was diagnosed with asthma with acute exacerbation.  She was previously requiring Breo 200 mg once daily and Spiriva 2.5 mg once daily.  She has also been on Singulair and Zyrtec.  Her medication had been weaned down to Breo 100 once daily and Spiriva was stopped by previous pulmonology service.  She had 2 ER visits over the last 4 to 5 months with acute worsening shortness of breath, cough and wheezing, not improved on rescue inhalers.  She was treated for asthma flareup and was given antibiotics and steroids for possible pneumonia both times.  She does not have insurance now.  She is currently using Breo 100 mg once daily, uses albuterol once or twice a week.  She has a cat at home.  She lives with her parents now.  She has air purifier.  Has carpets at home.  She is on steroid nasal spray as well as azelastine.  Does not have any acid reflux.  Her previous blood work showed IgE level at 200s, high eosinophil count.  She is a , is looking for a job now.  She lives with her parents.  She had a history of recurrent bronchitis when she was younger.  Never hospitalized for it.  She is also having work-up for hypersomnolence through sleep physician.  She has allergy and eczema.    Tobacco Use: Low Risk     Smoking Tobacco Use: Never    Smokeless Tobacco Use: Never    Passive Exposure: Not on file   .    Review of Systems     General:  No Fatigue, No Fever No weight loss or loss of  appetite  HEENT: No dysphagia, No Visual Changes, no rhinorrhea  Respiratory:  + cough,+Dyspnea, intermittent phlegm, No Pleuritic Pain, intermittent wheezing, no hemoptysis.  Cardiovascular: Denies chest pain, denies palpitations,+PAN, No Chest Pressure  Gastrointestinal:  No Abdominal Pain, No Nausea, No Vomiting, No Diarrhea  Genitourinary:  No Dysuria, No Frequency, No Hesitancy  Musculoskeletal: No muscle pain or swelling  Endocrine:  No Heat Intolerance, No Cold Intolerance  Hematologic:  No Bleeding, No Bruising  Psychiatric:  No Anxiety, No Depression  Neurologic:  No Confusion, no Dysarthria, No Headaches  Skin:  No Rash, No Open Wounds    Family History   Problem Relation Age of Onset    Hypertension Mother     Hypertension Maternal Grandfather     Kidney cancer Maternal Grandfather         Social History     Socioeconomic History    Marital status: Single   Tobacco Use    Smoking status: Never    Smokeless tobacco: Never   Vaping Use    Vaping Use: Never used   Substance and Sexual Activity    Alcohol use: Yes     Comment: occasionally    Drug use: Never    Sexual activity: Defer        Past Medical History:   Diagnosis Date    ADHD (attention deficit hyperactivity disorder)     Anxiety     Asthma     Depression     Kidney stone     Plantar wart     Seasonal allergies         Immunization History   Administered Date(s) Administered    COVID-19 (PFIZER) BIVALENT 12+YRS 10/14/2022    COVID-19 (PFIZER) Purple Cap Monovalent 04/10/2021, 05/01/2021, 11/02/2021    Flublok 18+yrs 09/30/2019, 10/22/2021, 10/14/2022    Fluzone Quad >6mos (Multi-dose) 10/10/2020         No Known Allergies       Current Outpatient Medications:     albuterol sulfate  (90 Base) MCG/ACT inhaler, Inhale 2 puffs Every 6 (Six) Hours As Needed for Wheezing., Disp: 18 g, Rfl: 1    atomoxetine (STRATTERA) 60 MG capsule, Take 1 capsule by mouth Daily., Disp: 30 capsule, Rfl: 5    azelastine (ASTELIN) 0.1 % nasal spray, 2 sprays into  "the nostril(s) as directed by provider 2 (Two) Times a Day. Use in each nostril as directed, Disp: 1 each, Rfl: 11    cetirizine (zyrTEC) 10 MG tablet, Take 1 tablet by mouth Daily for 30 days., Disp: 30 tablet, Rfl: 0    Fluticasone Furoate-Vilanterol 100-25 MCG/ACT aerosol powder , Inhale 1 puff Daily., Disp: 1 each, Rfl: 4    hydrOXYzine (ATARAX) 25 MG tablet, Take 1 tablet by mouth 3 (Three) Times a Day As Needed for Itching., Disp: 90 tablet, Rfl: 1    meloxicam (Mobic) 7.5 MG tablet, Take 1 tablet by mouth Daily., Disp: 30 tablet, Rfl: 5    montelukast (SINGULAIR) 10 MG tablet, Take 1 tablet by mouth Every Night for 30 days., Disp: 30 tablet, Rfl: 0    Serdexmethylphen-Dexmethylphen (Azstarys) 39.2-7.8 MG capsule, Take 1 capsule by mouth As Needed., Disp: , Rfl:     Fluticasone-Umeclidin-Vilant (Trelegy Ellipta) 100-62.5-25 MCG/ACT inhaler, Inhale 1 puff Daily for 1 day., Disp: 1 each, Rfl: 0    predniSONE (DELTASONE) 20 MG tablet, Take 2 tablets by mouth Daily., Disp: 14 tablet, Rfl: 1     Objective   Vital Signs:   /65 (BP Location: Left arm, Patient Position: Sitting, Cuff Size: Adult)   Pulse 82   Temp 98.2 øF (36.8 øC) (Tympanic)   Resp 18   Ht 165.1 cm (65\")   Wt 112 kg (246 lb 4.8 oz)   SpO2 97% Comment: room air  BMI 40.99 kg/mý   Mallampatti classification : 1  Physical Exam      Vital Signs Reviewed  Obese pleasant female, in no acute distress, normal conversational  HEENT:  PERRL, EOMI.  OP, nares clear, no sinus tenderness  Neck:  Supple, no JVD, no thyromegaly  Lymph: no axillary, cervical, supraclavicular lymphadenopathy noted bilaterally  Chest:  good aeration, bilateral diminished breath sounds, no wheezing, crackles or rhonchi, resonant to percussion b/l  CV: RRR, no MGR, pulses 2+, equal.  Abd:  Soft, NT, ND, + BS, no HSM  EXT:  no clubbing, no cyanosis, No BLE edema  Neuro:  A&Ox3, CN grossly intact, no focal deficits.  Skin: No rashes or lesions noted     Result Review :   The " following data was reviewed by: Myles Galvan MD on 08/17/2023:  Common labs          2/13/2023    08:23 5/15/2023    17:26   Common Labs   Glucose 79  95    BUN 9  7    Creatinine 0.67  0.68    Sodium 136  140    Potassium 4.2  3.9    Chloride 102  105    Calcium 9.4  9.0    Albumin 4.3  3.9    Total Bilirubin 0.5  0.3    Alkaline Phosphatase 74  78    AST (SGOT) 23  21    ALT (SGPT) 23  24    WBC 9.71  12.96    Hemoglobin 13.6  13.1    Hematocrit 41.2  38.8    Platelets 359  335    Total Cholesterol 150     Triglycerides 43     HDL Cholesterol 64     LDL Cholesterol  76       CMP          2/13/2023    08:23 5/15/2023    17:26   CMP   Glucose 79  95    BUN 9  7    Creatinine 0.67  0.68    EGFR 125.3  124.1    Sodium 136  140    Potassium 4.2  3.9    Chloride 102  105    Calcium 9.4  9.0    Total Protein 7.4  7.1    Albumin 4.3  3.9    Globulin 3.1  3.2    Total Bilirubin 0.5  0.3    Alkaline Phosphatase 74  78    AST (SGOT) 23  21    ALT (SGPT) 23  24    Albumin/Globulin Ratio 1.4  1.2    BUN/Creatinine Ratio 13.4  10.3    Anion Gap 8.2  9.8      CBC          2/13/2023    08:23 5/15/2023    17:26   CBC   WBC 9.71  12.96    RBC 4.81  4.53    Hemoglobin 13.6  13.1    Hematocrit 41.2  38.8    MCV 85.7  85.7    MCH 28.3  28.9    MCHC 33.0  33.8    RDW 13.0  14.0    Platelets 359  335      CBC w/diff          2/13/2023    08:23 5/15/2023    17:26   CBC w/Diff   WBC 9.71  12.96    RBC 4.81  4.53    Hemoglobin 13.6  13.1    Hematocrit 41.2  38.8    MCV 85.7  85.7    MCH 28.3  28.9    MCHC 33.0  33.8    RDW 13.0  14.0    Platelets 359  335    Neutrophil Rel % 67.4  70.5    Immature Granulocyte Rel % 0.7  0.3    Lymphocyte Rel % 22.5  16.0    Monocyte Rel % 7.7  6.9    Eosinophil Rel % 1.1  5.6    Basophil Rel % 0.6  0.7      Data reviewed : Radiologic studies previous chest x-rays reviewed.             Assessment and Plan    Diagnoses and all orders for this visit:    1. Seasonal allergic rhinitis, unspecified trigger  (Primary)  -     predniSONE (DELTASONE) 20 MG tablet; Take 2 tablets by mouth Daily.  Dispense: 14 tablet; Refill: 1  -     albuterol sulfate  (90 Base) MCG/ACT inhaler; Inhale 2 puffs Every 6 (Six) Hours As Needed for Wheezing.  Dispense: 18 g; Refill: 1  -     azelastine (ASTELIN) 0.1 % nasal spray; 2 sprays into the nostril(s) as directed by provider 2 (Two) Times a Day. Use in each nostril as directed  Dispense: 1 each; Refill: 11  -     cetirizine (zyrTEC) 10 MG tablet; Take 1 tablet by mouth Daily for 30 days.  Dispense: 30 tablet; Refill: 0  -     montelukast (SINGULAIR) 10 MG tablet; Take 1 tablet by mouth Every Night for 30 days.  Dispense: 30 tablet; Refill: 0  -     Fluticasone Furoate-Vilanterol 100-25 MCG/ACT aerosol powder ; Inhale 1 puff Daily.  Dispense: 1 each; Refill: 4    2. Uncomplicated asthma, unspecified asthma severity, unspecified whether persistent  -     predniSONE (DELTASONE) 20 MG tablet; Take 2 tablets by mouth Daily.  Dispense: 14 tablet; Refill: 1  -     albuterol sulfate  (90 Base) MCG/ACT inhaler; Inhale 2 puffs Every 6 (Six) Hours As Needed for Wheezing.  Dispense: 18 g; Refill: 1  -     azelastine (ASTELIN) 0.1 % nasal spray; 2 sprays into the nostril(s) as directed by provider 2 (Two) Times a Day. Use in each nostril as directed  Dispense: 1 each; Refill: 11  -     cetirizine (zyrTEC) 10 MG tablet; Take 1 tablet by mouth Daily for 30 days.  Dispense: 30 tablet; Refill: 0  -     montelukast (SINGULAIR) 10 MG tablet; Take 1 tablet by mouth Every Night for 30 days.  Dispense: 30 tablet; Refill: 0  -     Fluticasone Furoate-Vilanterol 100-25 MCG/ACT aerosol powder ; Inhale 1 puff Daily.  Dispense: 1 each; Refill: 4    3. Uncomplicated asthma, unspecified asthma severity, unspecified whether persistent  Comments:  Symptoms well controlled with current medication regimen, cont. Current meds.  Orders:  -     predniSONE (DELTASONE) 20 MG tablet; Take 2 tablets by mouth  Daily.  Dispense: 14 tablet; Refill: 1  -     albuterol sulfate  (90 Base) MCG/ACT inhaler; Inhale 2 puffs Every 6 (Six) Hours As Needed for Wheezing.  Dispense: 18 g; Refill: 1  -     azelastine (ASTELIN) 0.1 % nasal spray; 2 sprays into the nostril(s) as directed by provider 2 (Two) Times a Day. Use in each nostril as directed  Dispense: 1 each; Refill: 11  -     cetirizine (zyrTEC) 10 MG tablet; Take 1 tablet by mouth Daily for 30 days.  Dispense: 30 tablet; Refill: 0  -     montelukast (SINGULAIR) 10 MG tablet; Take 1 tablet by mouth Every Night for 30 days.  Dispense: 30 tablet; Refill: 0  -     Fluticasone Furoate-Vilanterol 100-25 MCG/ACT aerosol powder ; Inhale 1 puff Daily.  Dispense: 1 each; Refill: 4      Moderate persistent asthma with recurrent exacerbation:  Continue with Breo 100 mg once daily.  Patient does not have insurance, I have given her Trelegy Ellipta 100 mcg sample to use.  Use albuterol as needed.  We will check pulmonary function test after next office visit.  Her serum IgE level was high.  Eosinophil level was high.  If symptoms are not controlled, may need Biologics in future.  Advised her to avoid cat as much as possible.    Allergic rhinitis: Continue with Singulair once daily.  Continue with Zyrtec or Claritin as needed.    Postnasal drip: Continue with steroid nasal spray.  Continue with azelastine nasal spray.    Asthma exacerbation  Was discussed in detail: I have given her prednisone to use.      Follow Up   Return in about 4 months (around 12/17/2023).  Patient was given instructions and counseling regarding her condition or for health maintenance advice. Please see specific information pulled into the AVS if appropriate.        Electronically signed by Myles Galvan MD, 8/17/2023, 11:10 EDT.

## 2023-08-31 ENCOUNTER — OFFICE VISIT (OUTPATIENT)
Dept: SLEEP MEDICINE | Facility: HOSPITAL | Age: 23
End: 2023-08-31

## 2023-08-31 VITALS
HEIGHT: 65 IN | DIASTOLIC BLOOD PRESSURE: 62 MMHG | OXYGEN SATURATION: 99 % | SYSTOLIC BLOOD PRESSURE: 121 MMHG | HEART RATE: 82 BPM | WEIGHT: 242 LBS | BODY MASS INDEX: 40.32 KG/M2

## 2023-08-31 DIAGNOSIS — G47.10 HYPERSOMNIA: Primary | ICD-10-CM

## 2023-08-31 PROCEDURE — G0463 HOSPITAL OUTPT CLINIC VISIT: HCPCS

## 2023-08-31 NOTE — PROGRESS NOTES
"      [facility]         Dr Kaiser Mace  [unfilled]  Patient Care Team:  Christal Lim APRN as PCP - General (Family Medicine)  Amanda Orona MD as Consulting Physician (Urology)    Chief Complaint:Essentially sleep study negative for significant DARRIUS or sleep-related hypoxemia  Sleep architecture pretty normal with adequate amount of REM sleep and delta sleep noted  Moderate snoring noted  Mean sleep latency  15 minutes 54 seconds  No REM sleep noted in any of the naps     Impression  MSLT results as above  Sleep latency mean within normal limits  No REM sleep noted    Interval History: Patient here to review sleep study and MSLT.  She tells me that she is still kind of feels tired.  She goes to bed 11 PM gets up 9 AM gets about 10+ hours of sleep and feels the same.  No tobacco no alcohol no caffeine abuse.      REVIEW OF SYSTEMS:   CARDIOVASCULAR: No chest pain, chest pressure or chest discomfort. No palpitations or edema.   RESPIRATORY: No shortness of breath, cough or sputum.   GASTROINTESTINAL: No anorexia, nausea, vomiting or diarrhea. No abdominal pain or blood.   HEMATOLOGIC: No bleeding or bruising.   Also reports review of system positive for shortness of breath wheezing morning headache anxiety depression.  [place] 14 out of 24 consistent with sleepiness      Ventilator/Non-Invasive Ventilation Settings (From admission, onward)      None              Vital Signs  Heart Rate:  [82] 82  BP: (121)/(62) 121/62  [unfilled]  Flowsheet Rows      Flowsheet Row First Filed Value   Admission Height 165.1 cm (65\") Documented at 08/31/2023 0700   Admission Weight 110 kg (242 lb) Documented at 08/31/2023 0700            Physical Exam:  Patient is examined using the personal protective equipment as per guidelines from infection control for this particular patient as enacted.  Hand hygiene was performed before and after patient interaction.   General Appearance:    Alert, cooperative, in no acute " distress.  Following simple commands  ENT Mallampati between 3 and 4 no nasal congestion  Neck midline trachea, no thyromegaly   Lungs:     Clear to auscultation, respirations regular, even and                  unlabored    Heart:    Regular rhythm and normal rate, normal S1 and S2, no            murmur, no gallop, no rub, no click   Chest Wall:    No abnormalities observed   Abdomen:     Normal bowel sounds, no masses, no organomegaly, soft        nontender, nondistended, no guarding, no rebound                tenderness   Extremities:   Moves all extremities well, no edema, no cyanosis, no             redness  CNS no focal neurological deficits normal sensory exam  Skin no rashes no nodules  Musculoskeletal no cyanosis no clubbing normal range of motion     Results Review:                                          I reviewed the patient's new clinical results.  I personally viewed and interpreted the patient's chest x-ray.        Medication Review:       No current facility-administered medications for this visit.      ASSESSMENT:   Hypersomnia  Snoring  History of ADHD  Asthma  Depression  Sedating medications    PLAN:  Detailed discussion with the patient reviewed sleep study result and MSLT in detail  On the sleep study her latency to sleep was slightly prolonged but normal REM latency.  Adequate amount of REM sleep was noted.  AHI was within normal limits.  Some snoring was noted.  MSLT was also reviewed with the patient with no REM sleep noted with mean sleep latency within normal limits.  I suspect her issue is more related to fatigue than sleepiness.  I have asked her to reinforce sleep hygiene methods  Join an exercise program  Avoid sedating medications such as Atarax during the daytime  She will be followed up on an as-needed basis.      Kaiser Mace MD  08/31/23  09:16 EDT

## 2023-09-17 DIAGNOSIS — J30.2 SEASONAL ALLERGIC RHINITIS, UNSPECIFIED TRIGGER: ICD-10-CM

## 2023-09-17 DIAGNOSIS — J45.909 UNCOMPLICATED ASTHMA, UNSPECIFIED ASTHMA SEVERITY, UNSPECIFIED WHETHER PERSISTENT: ICD-10-CM

## 2023-09-18 RX ORDER — MONTELUKAST SODIUM 10 MG/1
TABLET ORAL
Qty: 30 TABLET | Refills: 0 | Status: SHIPPED | OUTPATIENT
Start: 2023-09-18

## 2023-10-15 DIAGNOSIS — J45.909 UNCOMPLICATED ASTHMA, UNSPECIFIED ASTHMA SEVERITY, UNSPECIFIED WHETHER PERSISTENT: ICD-10-CM

## 2023-10-15 DIAGNOSIS — J30.2 SEASONAL ALLERGIC RHINITIS, UNSPECIFIED TRIGGER: ICD-10-CM

## 2023-10-16 RX ORDER — CETIRIZINE HYDROCHLORIDE 10 MG/1
10 TABLET ORAL DAILY
Qty: 30 TABLET | Refills: 0 | Status: SHIPPED | OUTPATIENT
Start: 2023-10-16

## 2023-10-16 RX ORDER — MONTELUKAST SODIUM 10 MG/1
TABLET ORAL
Qty: 30 TABLET | Refills: 0 | Status: SHIPPED | OUTPATIENT
Start: 2023-10-16

## 2023-10-31 ENCOUNTER — LAB (OUTPATIENT)
Dept: LAB | Facility: HOSPITAL | Age: 23
End: 2023-10-31
Payer: COMMERCIAL

## 2023-10-31 ENCOUNTER — OFFICE VISIT (OUTPATIENT)
Dept: FAMILY MEDICINE CLINIC | Facility: CLINIC | Age: 23
End: 2023-10-31
Payer: COMMERCIAL

## 2023-10-31 VITALS
HEIGHT: 65 IN | BODY MASS INDEX: 42.15 KG/M2 | SYSTOLIC BLOOD PRESSURE: 130 MMHG | TEMPERATURE: 98.1 F | WEIGHT: 253 LBS | HEART RATE: 80 BPM | OXYGEN SATURATION: 98 % | DIASTOLIC BLOOD PRESSURE: 73 MMHG

## 2023-10-31 DIAGNOSIS — J45.20 MILD INTERMITTENT ASTHMA WITHOUT COMPLICATION: ICD-10-CM

## 2023-10-31 DIAGNOSIS — F32.0 CURRENT MILD EPISODE OF MAJOR DEPRESSIVE DISORDER, UNSPECIFIED WHETHER RECURRENT: ICD-10-CM

## 2023-10-31 DIAGNOSIS — R53.82 CHRONIC FATIGUE: ICD-10-CM

## 2023-10-31 DIAGNOSIS — Z00.00 ANNUAL PHYSICAL EXAM: Primary | ICD-10-CM

## 2023-10-31 DIAGNOSIS — F41.9 ANXIETY: ICD-10-CM

## 2023-10-31 DIAGNOSIS — J30.2 SEASONAL ALLERGIC RHINITIS, UNSPECIFIED TRIGGER: ICD-10-CM

## 2023-10-31 LAB
ALBUMIN SERPL-MCNC: 4.1 G/DL (ref 3.5–5.2)
ALBUMIN/GLOB SERPL: 1.5 G/DL
ALP SERPL-CCNC: 83 U/L (ref 39–117)
ALT SERPL W P-5'-P-CCNC: 71 U/L (ref 1–33)
ANION GAP SERPL CALCULATED.3IONS-SCNC: 9 MMOL/L (ref 5–15)
AST SERPL-CCNC: 45 U/L (ref 1–32)
BASOPHILS # BLD AUTO: 0.06 10*3/MM3 (ref 0–0.2)
BASOPHILS NFR BLD AUTO: 0.6 % (ref 0–1.5)
BILIRUB SERPL-MCNC: 0.6 MG/DL (ref 0–1.2)
BUN SERPL-MCNC: 6 MG/DL (ref 6–20)
BUN/CREAT SERPL: 8.6 (ref 7–25)
CALCIUM SPEC-SCNC: 9.4 MG/DL (ref 8.6–10.5)
CHLORIDE SERPL-SCNC: 106 MMOL/L (ref 98–107)
CO2 SERPL-SCNC: 25 MMOL/L (ref 22–29)
CREAT SERPL-MCNC: 0.7 MG/DL (ref 0.57–1)
DEPRECATED RDW RBC AUTO: 42.9 FL (ref 37–54)
EGFRCR SERPLBLD CKD-EPI 2021: 123.3 ML/MIN/1.73
EOSINOPHIL # BLD AUTO: 0.23 10*3/MM3 (ref 0–0.4)
EOSINOPHIL NFR BLD AUTO: 2.1 % (ref 0.3–6.2)
ERYTHROCYTE [DISTWIDTH] IN BLOOD BY AUTOMATED COUNT: 13.8 % (ref 12.3–15.4)
FERRITIN SERPL-MCNC: 105 NG/ML (ref 13–150)
FOLATE SERPL-MCNC: 6.36 NG/ML (ref 4.78–24.2)
GLOBULIN UR ELPH-MCNC: 2.7 GM/DL
GLUCOSE SERPL-MCNC: 102 MG/DL (ref 65–99)
HCT VFR BLD AUTO: 40.3 % (ref 34–46.6)
HGB BLD-MCNC: 13.9 G/DL (ref 12–15.9)
IMM GRANULOCYTES # BLD AUTO: 0.11 10*3/MM3 (ref 0–0.05)
IMM GRANULOCYTES NFR BLD AUTO: 1 % (ref 0–0.5)
IRON 24H UR-MRATE: 68 MCG/DL (ref 37–145)
IRON SATN MFR SERPL: 19 % (ref 20–50)
LYMPHOCYTES # BLD AUTO: 2.55 10*3/MM3 (ref 0.7–3.1)
LYMPHOCYTES NFR BLD AUTO: 23.5 % (ref 19.6–45.3)
MCH RBC QN AUTO: 29.8 PG (ref 26.6–33)
MCHC RBC AUTO-ENTMCNC: 34.5 G/DL (ref 31.5–35.7)
MCV RBC AUTO: 86.5 FL (ref 79–97)
MONOCYTES # BLD AUTO: 0.6 10*3/MM3 (ref 0.1–0.9)
MONOCYTES NFR BLD AUTO: 5.5 % (ref 5–12)
NEUTROPHILS NFR BLD AUTO: 67.3 % (ref 42.7–76)
NEUTROPHILS NFR BLD AUTO: 7.28 10*3/MM3 (ref 1.7–7)
NRBC BLD AUTO-RTO: 0 /100 WBC (ref 0–0.2)
PLATELET # BLD AUTO: 371 10*3/MM3 (ref 140–450)
PMV BLD AUTO: 9.7 FL (ref 6–12)
POTASSIUM SERPL-SCNC: 4.4 MMOL/L (ref 3.5–5.2)
PROT SERPL-MCNC: 6.8 G/DL (ref 6–8.5)
RBC # BLD AUTO: 4.66 10*6/MM3 (ref 3.77–5.28)
SODIUM SERPL-SCNC: 140 MMOL/L (ref 136–145)
T4 FREE SERPL-MCNC: 1.08 NG/DL (ref 0.93–1.7)
TIBC SERPL-MCNC: 365 MCG/DL (ref 298–536)
TRANSFERRIN SERPL-MCNC: 245 MG/DL (ref 200–360)
TSH SERPL DL<=0.05 MIU/L-ACNC: 3.23 UIU/ML (ref 0.27–4.2)
VIT B12 BLD-MCNC: 259 PG/ML (ref 211–946)
WBC NRBC COR # BLD: 10.83 10*3/MM3 (ref 3.4–10.8)

## 2023-10-31 PROCEDURE — 86664 EPSTEIN-BARR NUCLEAR ANTIGEN: CPT

## 2023-10-31 PROCEDURE — 84466 ASSAY OF TRANSFERRIN: CPT

## 2023-10-31 PROCEDURE — 82607 VITAMIN B-12: CPT

## 2023-10-31 PROCEDURE — 80050 GENERAL HEALTH PANEL: CPT

## 2023-10-31 PROCEDURE — 86645 CMV ANTIBODY IGM: CPT

## 2023-10-31 PROCEDURE — 36415 COLL VENOUS BLD VENIPUNCTURE: CPT

## 2023-10-31 PROCEDURE — 82728 ASSAY OF FERRITIN: CPT

## 2023-10-31 PROCEDURE — 83540 ASSAY OF IRON: CPT

## 2023-10-31 PROCEDURE — 86644 CMV ANTIBODY: CPT

## 2023-10-31 PROCEDURE — 86665 EPSTEIN-BARR CAPSID VCA: CPT

## 2023-10-31 PROCEDURE — 82746 ASSAY OF FOLIC ACID SERUM: CPT

## 2023-10-31 PROCEDURE — 84439 ASSAY OF FREE THYROXINE: CPT

## 2023-10-31 RX ORDER — FLUTICASONE FUROATE, UMECLIDINIUM BROMIDE AND VILANTEROL TRIFENATATE 100; 62.5; 25 UG/1; UG/1; UG/1
1 POWDER RESPIRATORY (INHALATION)
COMMUNITY

## 2023-10-31 RX ORDER — TRIAMCINOLONE ACETONIDE 55 UG/1
2 SPRAY, METERED NASAL DAILY
COMMUNITY

## 2023-10-31 NOTE — PROGRESS NOTES
Chief Complaint    Annual physical exam   Follow-up (Sleep study), Anxiety, and Depression    SUBJECTIVE  Sylvia Oliva presents to [facility]  Annual physical exam     Anxiety/Depression:  Patient is taking Hydroxyzine, with good control of symptoms.  Patient states she stopped taking Prozac due to side effects.  Patient has also tried Lexapro.  Patient denies suicidal ideations or thoughts of harming herself or others.  Patient is requesting to have GeneSight testing completed.    Pt states she rarely takes the hydroxyzine, but she would take it if she was very nervous, but it caused a lot of drowsiness.    Patient would like to discuss the results of her recent sleep study.  Patient was told she did not have sleep apnea, but she is still complaining of fatigue.    History of Present Illness  Past Medical History:   Diagnosis Date    ADHD (attention deficit hyperactivity disorder)     Anxiety     Asthma     Depression     Kidney stone     Plantar wart     Seasonal allergies       Family History   Problem Relation Age of Onset    Hypertension Mother     Hypertension Maternal Grandfather     Kidney cancer Maternal Grandfather       Past Surgical History:   Procedure Laterality Date    CYSTOSCOPY URETEROSCOPY Right 10/4/2021    Procedure: CYSTOSCOPY RIGHT URETEROSCOPY, RETROGRADE PYELOGRAM, LASERTRIPSY, STONE BASKET EXTRACTION AND STENT INSERTION;  Surgeon: Amanda Orona MD;  Location: [facility] OR;  Service: Urology;  Laterality: Right;    ENDOSCOPY      MOUTH SURGERY          Current Outpatient Medications:     albuterol sulfate  (90 Base) MCG/ACT inhaler, Inhale 2 puffs Every 6 (Six) Hours As Needed for Wheezing., Disp: 18 g, Rfl: 1    atomoxetine (STRATTERA) 60 MG capsule, Take 1 capsule by mouth Daily., Disp: 30 capsule, Rfl: 5    azelastine (ASTELIN) 0.1 % nasal spray, 2 sprays into the nostril(s) as directed by provider 2 (Two) Times a Day. Use in each nostril  "as directed, Disp: 1 each, Rfl: 11    cetirizine (zyrTEC) 10 MG tablet, TAKE 1 TABLET BY MOUTH DAILY, Disp: 30 tablet, Rfl: 0    Fluticasone-Umeclidin-Vilant (Trelegy Ellipta) 100-62.5-25 MCG/ACT inhaler, Inhale 1 puff Daily., Disp: , Rfl:     hydrOXYzine (ATARAX) 25 MG tablet, Take 1 tablet by mouth 3 (Three) Times a Day As Needed for Itching., Disp: 90 tablet, Rfl: 1    meloxicam (Mobic) 7.5 MG tablet, Take 1 tablet by mouth Daily., Disp: 30 tablet, Rfl: 5    montelukast (SINGULAIR) 10 MG tablet, TAKE ONE TABLET BY MOUTH ONCE NIGHTLY, Disp: 30 tablet, Rfl: 0    Serdexmethylphen-Dexmethylphen (Azstarys) 39.2-7.8 MG capsule, Take 1 capsule by mouth As Needed., Disp: , Rfl:     Triamcinolone Acetonide (NASACORT) 55 MCG/ACT nasal inhaler, 2 sprays into the nostril(s) as directed by provider Daily., Disp: , Rfl:     OBJECTIVE  Vital Signs:   /73 (BP Location: Left arm, Patient Position: Sitting, Cuff Size: Large Adult)   Pulse 80   Temp 98.1 °F (36.7 °C) (Oral)   Ht 165.1 cm (65\")   Wt 115 kg (253 lb)   SpO2 98%   BMI 42.10 kg/m²    Estimated body mass index is 42.1 kg/m² as calculated from the following:    Height as of this encounter: 165.1 cm (65\").    Weight as of this encounter: 115 kg (253 lb).     Wt Readings from Last 3 Encounters:   10/31/23 115 kg (253 lb)   08/31/23 110 kg (242 lb)   08/17/23 112 kg (246 lb 4.8 oz)     BP Readings from Last 3 Encounters:   10/31/23 130/73   08/31/23 121/62   08/17/23 117/65       Physical Exam  Vitals reviewed.   Constitutional:       Appearance: Normal appearance. She is well-developed.   HENT:      Head: Normocephalic and atraumatic.      Right Ear: External ear normal.      Left Ear: External ear normal.      Mouth/Throat:      Pharynx: No oropharyngeal exudate.   Eyes:      Conjunctiva/sclera: Conjunctivae normal.      Pupils: Pupils are equal, round, and reactive to light.   Cardiovascular:      Rate and Rhythm: Normal rate and regular rhythm.      Pulses: " Normal pulses.      Heart sounds: Normal heart sounds. No murmur heard.     No friction rub. No gallop.   Pulmonary:      Effort: Pulmonary effort is normal.      Breath sounds: Normal breath sounds. No wheezing or rhonchi.   Skin:     General: Skin is warm and dry.   Neurological:      Mental Status: She is alert and oriented to person, place, and time.      Cranial Nerves: No cranial nerve deficit.   Psychiatric:         Mood and Affect: Mood and affect normal.         Behavior: Behavior normal.         Thought Content: Thought content normal.         Judgment: Judgment normal.          Result Review        No Images in the past 120 days found..      The above data has been reviewed by JEFF Patel 10/31/2023 11:07 EDT.          Patient Care Team:  Christal Lim APRN as PCP - General (Family Medicine)  Amanda Orona MD as Consulting Physician (Urology)            ASSESSMENT & PLAN    Diagnoses and all orders for this visit:    1. Annual physical exam (Primary)    2. Seasonal allergic rhinitis, unspecified trigger  -     Ambulatory Referral to Allergy    3. Current mild episode of major depressive disorder, unspecified whether recurrent  Comments:  Will check GeneSight swab as patient has had resistance to several medications.  Orders:  -     GeneSight - Swab,; Future    4. Mild intermittent asthma without complication  Comments:  Stable and controlled, continue medication    5. Chronic fatigue  -     CBC & Differential; Future  -     Comprehensive Metabolic Panel; Future  -     TSH; Future  -     T4, Free; Future  -     EBV Antibody Profile; Future  -     CMV IgG IgM; Future  -     Vitamin B12; Future  -     Folate; Future  -     Iron Profile; Future  -     Ferritin; Future    6. Anxiety  Comments:  Will check GeneSight swab as patient has had resistance to several medications.  Orders:  -     GeneSight - Swab,; Future         Tobacco Use: Low Risk  (10/31/2023)    Patient History     Smoking  Tobacco Use: Never     Smokeless Tobacco Use: Never     Passive Exposure: Not on file       Follow Up     Return in about 4 weeks (around 11/28/2023).      Patient was given instructions and counseling regarding her condition or for health maintenance advice. Please see specific information pulled into the AVS if appropriate.   I have reviewed information obtained and documented by others and I have confirmed the accuracy of this documented note.    Christal Lim, APRN

## 2023-11-02 LAB
CMV IGG SERPL IA-ACNC: <0.6 U/ML (ref 0–0.59)
CMV IGM SERPL IA-ACNC: <30 AU/ML (ref 0–29.9)
EBV NA IGG SER IA-ACNC: >600 U/ML (ref 0–17.9)
EBV VCA IGG SER IA-ACNC: >600 U/ML (ref 0–17.9)
EBV VCA IGM SER IA-ACNC: <36 U/ML (ref 0–35.9)
SERVICE CMNT-IMP: ABNORMAL

## 2023-11-06 DIAGNOSIS — R74.8 ELEVATED LIVER ENZYMES: Primary | ICD-10-CM

## 2023-11-06 DIAGNOSIS — D72.829 LEUKOCYTOSIS, UNSPECIFIED TYPE: ICD-10-CM

## 2023-11-13 DIAGNOSIS — J45.909 UNCOMPLICATED ASTHMA, UNSPECIFIED ASTHMA SEVERITY, UNSPECIFIED WHETHER PERSISTENT: ICD-10-CM

## 2023-11-13 DIAGNOSIS — J30.2 SEASONAL ALLERGIC RHINITIS, UNSPECIFIED TRIGGER: ICD-10-CM

## 2023-11-13 RX ORDER — MONTELUKAST SODIUM 10 MG/1
TABLET ORAL
Qty: 30 TABLET | Refills: 0 | Status: SHIPPED | OUTPATIENT
Start: 2023-11-13

## 2023-11-22 ENCOUNTER — OFFICE VISIT (OUTPATIENT)
Dept: FAMILY MEDICINE CLINIC | Facility: CLINIC | Age: 23
End: 2023-11-22
Payer: COMMERCIAL

## 2023-11-22 ENCOUNTER — LAB (OUTPATIENT)
Dept: LAB | Facility: HOSPITAL | Age: 23
End: 2023-11-22
Payer: COMMERCIAL

## 2023-11-22 VITALS
WEIGHT: 252.4 LBS | OXYGEN SATURATION: 98 % | HEART RATE: 85 BPM | TEMPERATURE: 97.9 F | DIASTOLIC BLOOD PRESSURE: 64 MMHG | BODY MASS INDEX: 42.05 KG/M2 | SYSTOLIC BLOOD PRESSURE: 122 MMHG | HEIGHT: 65 IN

## 2023-11-22 DIAGNOSIS — R74.8 ELEVATED LIVER ENZYMES: ICD-10-CM

## 2023-11-22 DIAGNOSIS — F32.0 CURRENT MILD EPISODE OF MAJOR DEPRESSIVE DISORDER, UNSPECIFIED WHETHER RECURRENT: ICD-10-CM

## 2023-11-22 DIAGNOSIS — F41.9 ANXIETY: Primary | ICD-10-CM

## 2023-11-22 DIAGNOSIS — D72.829 LEUKOCYTOSIS, UNSPECIFIED TYPE: ICD-10-CM

## 2023-11-22 DIAGNOSIS — J30.2 SEASONAL ALLERGIC RHINITIS, UNSPECIFIED TRIGGER: ICD-10-CM

## 2023-11-22 LAB
BASOPHILS # BLD AUTO: 0.06 10*3/MM3 (ref 0–0.2)
BASOPHILS NFR BLD AUTO: 0.8 % (ref 0–1.5)
DEPRECATED RDW RBC AUTO: 40 FL (ref 37–54)
EOSINOPHIL # BLD AUTO: 0.13 10*3/MM3 (ref 0–0.4)
EOSINOPHIL NFR BLD AUTO: 1.7 % (ref 0.3–6.2)
ERYTHROCYTE [DISTWIDTH] IN BLOOD BY AUTOMATED COUNT: 13.2 % (ref 12.3–15.4)
HAV IGM SERPL QL IA: NORMAL
HBV CORE IGM SERPL QL IA: NORMAL
HBV SURFACE AG SERPL QL IA: NORMAL
HCT VFR BLD AUTO: 39.8 % (ref 34–46.6)
HCV AB SER DONR QL: NORMAL
HGB BLD-MCNC: 13.4 G/DL (ref 12–15.9)
IMM GRANULOCYTES # BLD AUTO: 0.04 10*3/MM3 (ref 0–0.05)
IMM GRANULOCYTES NFR BLD AUTO: 0.5 % (ref 0–0.5)
LYMPHOCYTES # BLD AUTO: 1.9 10*3/MM3 (ref 0.7–3.1)
LYMPHOCYTES NFR BLD AUTO: 24.5 % (ref 19.6–45.3)
MCH RBC QN AUTO: 28.8 PG (ref 26.6–33)
MCHC RBC AUTO-ENTMCNC: 33.7 G/DL (ref 31.5–35.7)
MCV RBC AUTO: 85.4 FL (ref 79–97)
MONOCYTES # BLD AUTO: 0.55 10*3/MM3 (ref 0.1–0.9)
MONOCYTES NFR BLD AUTO: 7.1 % (ref 5–12)
NEUTROPHILS NFR BLD AUTO: 5.08 10*3/MM3 (ref 1.7–7)
NEUTROPHILS NFR BLD AUTO: 65.4 % (ref 42.7–76)
NRBC BLD AUTO-RTO: 0 /100 WBC (ref 0–0.2)
PLATELET # BLD AUTO: 374 10*3/MM3 (ref 140–450)
PMV BLD AUTO: 9.4 FL (ref 6–12)
RBC # BLD AUTO: 4.66 10*6/MM3 (ref 3.77–5.28)
WBC NRBC COR # BLD AUTO: 7.76 10*3/MM3 (ref 3.4–10.8)

## 2023-11-22 PROCEDURE — 36415 COLL VENOUS BLD VENIPUNCTURE: CPT

## 2023-11-22 PROCEDURE — 85025 COMPLETE CBC W/AUTO DIFF WBC: CPT

## 2023-11-22 PROCEDURE — 99214 OFFICE O/P EST MOD 30 MIN: CPT | Performed by: NURSE PRACTITIONER

## 2023-11-22 PROCEDURE — 80074 ACUTE HEPATITIS PANEL: CPT

## 2023-11-22 RX ORDER — DESVENLAFAXINE 25 MG/1
25 TABLET, EXTENDED RELEASE ORAL DAILY
Qty: 30 TABLET | Refills: 1 | Status: SHIPPED | OUTPATIENT
Start: 2023-11-22

## 2023-11-22 NOTE — PROGRESS NOTES
Chief Complaint  Follow-up (3 weeks), Anxiety, and Depression    SUBJECTIVE  Sylvia Oliva presents to [facility]    Anxiety/Depression:  Patient is Hydroxyzine PRN, with good control of symptoms.  Patient denies suicidal ideations or thoughts of harming herself or others.  Patient here to follow up on GeneSight DNA testing.      History of Present Illness  Past Medical History:   Diagnosis Date    ADHD (attention deficit hyperactivity disorder)     Anxiety     Asthma     Depression     Kidney stone     Plantar wart     Seasonal allergies       Family History   Problem Relation Age of Onset    Hypertension Mother     Hypertension Maternal Grandfather     Kidney cancer Maternal Grandfather       Past Surgical History:   Procedure Laterality Date    CYSTOSCOPY URETEROSCOPY Right 10/4/2021    Procedure: CYSTOSCOPY RIGHT URETEROSCOPY, RETROGRADE PYELOGRAM, LASERTRIPSY, STONE BASKET EXTRACTION AND STENT INSERTION;  Surgeon: Amanda Orona MD;  Location: [facility];  Service: Urology;  Laterality: Right;    ENDOSCOPY      MOUTH SURGERY          Current Outpatient Medications:     albuterol sulfate  (90 Base) MCG/ACT inhaler, Inhale 2 puffs Every 6 (Six) Hours As Needed for Wheezing., Disp: 18 g, Rfl: 1    atomoxetine (STRATTERA) 60 MG capsule, Take 1 capsule by mouth Daily., Disp: 30 capsule, Rfl: 5    azelastine (ASTELIN) 0.1 % nasal spray, 2 sprays into the nostril(s) as directed by provider 2 (Two) Times a Day. Use in each nostril as directed, Disp: 1 each, Rfl: 11    cetirizine (zyrTEC) 10 MG tablet, TAKE 1 TABLET BY MOUTH DAILY, Disp: 30 tablet, Rfl: 0    Fluticasone-Umeclidin-Vilant (Trelegy Ellipta) 100-62.5-25 MCG/ACT inhaler, Inhale 1 puff Daily., Disp: , Rfl:     hydrOXYzine (ATARAX) 25 MG tablet, Take 1 tablet by mouth 3 (Three) Times a Day As Needed for Itching., Disp: 90 tablet, Rfl: 1    meloxicam (Mobic) 7.5 MG tablet, Take 1 tablet by mouth Daily., Disp: 30  "tablet, Rfl: 5    montelukast (SINGULAIR) 10 MG tablet, TAKE ONE TABLET BY MOUTH ONCE NIGHTLY, Disp: 30 tablet, Rfl: 0    Serdexmethylphen-Dexmethylphen (Azstarys) 39.2-7.8 MG capsule, Take 1 capsule by mouth As Needed., Disp: , Rfl:     Triamcinolone Acetonide (NASACORT) 55 MCG/ACT nasal inhaler, 2 sprays into the nostril(s) as directed by provider Daily., Disp: , Rfl:     Desvenlafaxine Succinate ER (Pristiq) 25 MG tablet sustained-release 24 hour, Take 1 tablet by mouth Daily., Disp: 30 tablet, Rfl: 1    OBJECTIVE  Vital Signs:   /64 (BP Location: Left arm, Patient Position: Sitting, Cuff Size: Large Adult)   Pulse 85   Temp 97.9 °F (36.6 °C) (Oral)   Ht 165.1 cm (65\")   Wt 114 kg (252 lb 6.4 oz)   SpO2 98%   BMI 42.00 kg/m²    Estimated body mass index is 42 kg/m² as calculated from the following:    Height as of this encounter: 165.1 cm (65\").    Weight as of this encounter: 114 kg (252 lb 6.4 oz).     Wt Readings from Last 3 Encounters:   11/22/23 114 kg (252 lb 6.4 oz)   10/31/23 115 kg (253 lb)   08/31/23 110 kg (242 lb)     BP Readings from Last 3 Encounters:   11/22/23 122/64   10/31/23 130/73   08/31/23 121/62       Physical Exam  Vitals reviewed.   Constitutional:       Appearance: Normal appearance. She is well-developed.   HENT:      Head: Normocephalic and atraumatic.      Right Ear: External ear normal.      Left Ear: External ear normal.      Mouth/Throat:      Pharynx: No oropharyngeal exudate.   Eyes:      Conjunctiva/sclera: Conjunctivae normal.      Pupils: Pupils are equal, round, and reactive to light.   Cardiovascular:      Rate and Rhythm: Normal rate and regular rhythm.      Pulses: Normal pulses.      Heart sounds: Normal heart sounds. No murmur heard.     No friction rub. No gallop.   Pulmonary:      Effort: Pulmonary effort is normal.      Breath sounds: Normal breath sounds. No wheezing or rhonchi.   Skin:     General: Skin is warm and dry.   Neurological:      Mental " Status: She is alert and oriented to person, place, and time.      Cranial Nerves: No cranial nerve deficit.   Psychiatric:         Mood and Affect: Mood and affect normal.         Behavior: Behavior normal.         Thought Content: Thought content normal.         Judgment: Judgment normal.          Result Review        No Images in the past 120 days found..      The above data has been reviewed by JEFF Patel 11/22/2023 11:04 EST.          Patient Care Team:  Christal Lim APRN as PCP - General (Family Medicine)  Amanda Orona MD as Consulting Physician (Urology)            ASSESSMENT & PLAN    Diagnoses and all orders for this visit:    1. Anxiety (Primary)  Comments:  Start Pristiq 25 mg daily, side effect and administration addressed.  Continue hydroxyzine as needed.  Orders:  -     Desvenlafaxine Succinate ER (Pristiq) 25 MG tablet sustained-release 24 hour; Take 1 tablet by mouth Daily.  Dispense: 30 tablet; Refill: 1    2. Current mild episode of major depressive disorder, unspecified whether recurrent  Comments:  Start Pristiq 25 mg daily, side effect and administration addressed. Continue hydroxyzine as needed.  Orders:  -     Desvenlafaxine Succinate ER (Pristiq) 25 MG tablet sustained-release 24 hour; Take 1 tablet by mouth Daily.  Dispense: 30 tablet; Refill: 1    3. Seasonal allergic rhinitis, unspecified trigger  Comments:  Symptoms well controlled with current medication regimen, cont. Current meds.       Reviewed [facility] Games[facility] results with patient, and patient provided copy.  Tobacco Use: Low Risk  (11/22/2023)    Patient History     Smoking Tobacco Use: Never     Smokeless Tobacco Use: Never     Passive Exposure: Not on file       Follow Up     Return in about 2 months (around 1/22/2024).      Patient was given instructions and counseling regarding her condition or for health maintenance advice. Please see specific information pulled into the AVS if appropriate.   I have reviewed  information obtained and documented by others and I have confirmed the accuracy of this documented note.    Christal Lim APRN

## 2023-11-25 DIAGNOSIS — J45.909 UNCOMPLICATED ASTHMA, UNSPECIFIED ASTHMA SEVERITY, UNSPECIFIED WHETHER PERSISTENT: ICD-10-CM

## 2023-11-25 DIAGNOSIS — J30.2 SEASONAL ALLERGIC RHINITIS, UNSPECIFIED TRIGGER: ICD-10-CM

## 2023-11-26 ENCOUNTER — HOSPITAL ENCOUNTER (OUTPATIENT)
Dept: ULTRASOUND IMAGING | Facility: HOSPITAL | Age: 23
Discharge: HOME | End: 2023-11-26
Admitting: NURSE PRACTITIONER
Payer: COMMERCIAL

## 2023-11-26 DIAGNOSIS — R74.8 ELEVATED LIVER ENZYMES: ICD-10-CM

## 2023-11-26 PROCEDURE — 76705 ECHO EXAM OF ABDOMEN: CPT

## 2023-11-27 RX ORDER — CETIRIZINE HYDROCHLORIDE 10 MG/1
10 TABLET ORAL DAILY
Qty: 30 TABLET | Refills: 1 | Status: SHIPPED | OUTPATIENT
Start: 2023-11-27

## 2023-12-05 DIAGNOSIS — F41.9 ANXIETY: ICD-10-CM

## 2023-12-07 ENCOUNTER — OFFICE VISIT (OUTPATIENT)
Dept: PULMONOLOGY | Facility: CLINIC | Age: 23
End: 2023-12-07
Payer: COMMERCIAL

## 2023-12-07 VITALS
OXYGEN SATURATION: 97 % | SYSTOLIC BLOOD PRESSURE: 115 MMHG | DIASTOLIC BLOOD PRESSURE: 67 MMHG | BODY MASS INDEX: 41.65 KG/M2 | TEMPERATURE: 98 F | HEART RATE: 89 BPM | WEIGHT: 250 LBS | HEIGHT: 65 IN | RESPIRATION RATE: 18 BRPM

## 2023-12-07 DIAGNOSIS — J45.909 UNCOMPLICATED ASTHMA, UNSPECIFIED ASTHMA SEVERITY, UNSPECIFIED WHETHER PERSISTENT: ICD-10-CM

## 2023-12-07 DIAGNOSIS — J30.2 SEASONAL ALLERGIC RHINITIS, UNSPECIFIED TRIGGER: Primary | ICD-10-CM

## 2023-12-07 RX ORDER — PREDNISONE 20 MG/1
40 TABLET ORAL DAILY
Qty: 14 TABLET | Refills: 2 | Status: SHIPPED | OUTPATIENT
Start: 2023-12-07 | End: 2023-12-14

## 2023-12-07 RX ORDER — FLUTICASONE FUROATE, UMECLIDINIUM BROMIDE AND VILANTEROL TRIFENATATE 100; 62.5; 25 UG/1; UG/1; UG/1
1 POWDER RESPIRATORY (INHALATION)
Qty: 1 EACH | Refills: 6 | Status: SHIPPED | OUTPATIENT
Start: 2023-12-07

## 2023-12-07 RX ORDER — ALBUTEROL SULFATE 90 UG/1
2 AEROSOL, METERED RESPIRATORY (INHALATION) EVERY 6 HOURS PRN
Qty: 18 G | Refills: 1 | Status: SHIPPED | OUTPATIENT
Start: 2023-12-07

## 2023-12-07 RX ORDER — MONTELUKAST SODIUM 10 MG/1
10 TABLET ORAL NIGHTLY
Qty: 90 TABLET | Refills: 3 | Status: SHIPPED | OUTPATIENT
Start: 2023-12-07

## 2023-12-07 RX ORDER — AZELASTINE 1 MG/ML
2 SPRAY, METERED NASAL 2 TIMES DAILY
Qty: 1 EACH | Refills: 11 | Status: SHIPPED | OUTPATIENT
Start: 2023-12-07

## 2023-12-07 RX ORDER — CETIRIZINE HYDROCHLORIDE 10 MG/1
10 TABLET ORAL DAILY
Qty: 30 TABLET | Refills: 1 | Status: SHIPPED | OUTPATIENT
Start: 2023-12-07

## 2023-12-07 NOTE — PROGRESS NOTES
CHIEF COMPLAINT    Primary Care Provider  Christal Lim APRN     Referring Provider  No ref. provider found    Patient Complaint  Follow-up (4 Months) and Asthma        Subjective          Sylvia Oliva presents to [facility] PULMONARY [facility]ITICAL [facility]ARE MEDICINE  History of Present Illness  Sylvia Oliva is a 23 y.o. female who has moderate persistent asthma.  She was referred to me by ER provider.  She had 2 hospitalizations in 2020, was diagnosed with asthma with acute exacerbation.  She was previously requiring Breo 200 mg once daily and Spiriva 2.5 mg once daily.  She has also been on Singulair and Zyrtec.  Her medication had been weaned down to Breo 100 once daily and Spiriva was stopped by previous pulmonology service.  She had 2 ER visits over the last 4 to 5 months with acute worsening shortness of breath, cough and wheezing, not improved on rescue inhalers.  She was treated for asthma flareup and was given antibiotics and steroids for possible pneumonia both times.      Since her last office visit, given her recurrent flareups, hide we had increased her inhalers to Trelegy Ellipta 100 mcg once daily.  She was continued on Singulair 10 mg once daily and albuterol as needed.  She was continued on azelastine and cetirizine as well.  She does have 2 dogs and 1 cat at home.  She tries to stay away from cat.  Does have sneezing and wheezing with cat.  Cold air does bother her.  She is here.  Failure at home. She is on steroid nasal spray as well as azelastine.  Does not have any acid reflux.  Her previous blood work showed IgE level at 200s, high eosinophil count.  She is a , is looking for a job now.  She lives with her parents.  She had a history of recurrent bronchitis when she was younger.  Never hospitalized for it.  She is also having work-up for hypersomnolence through sleep physician.  She has allergy and eczema.  She recently ran out of Trelegy Ellipta and started  needing rescue inhaler several times a day.    Tobacco Use: Low Risk  (12/7/2023)    Patient History     Smoking Tobacco Use: Never     Smokeless Tobacco Use: Never     Passive Exposure: Not on file   .    Review of Systems     General:  No Fatigue, No Fever No weight loss or loss of appetite  HEENT: No dysphagia, No Visual Changes, no rhinorrhea  Respiratory:  + cough,+Dyspnea, intermittent phlegm, No Pleuritic Pain, intermittent wheezing, no hemoptysis.  Cardiovascular: Denies chest pain, denies palpitations,+PAN, No Chest Pressure  Gastrointestinal:  No Abdominal Pain, No Nausea, No Vomiting, No Diarrhea  Genitourinary:  No Dysuria, No Frequency, No Hesitancy  Musculoskeletal: No muscle pain or swelling  Endocrine:  No Heat Intolerance, No Cold Intolerance  Hematologic:  No Bleeding, No Bruising  Psychiatric:  No Anxiety, No Depression  Neurologic:  No Confusion, no Dysarthria, No Headaches  Skin:  No Rash, No Open Wounds    Family History   Problem Relation Age of Onset    Hypertension Mother     Hypertension Maternal Grandfather     Kidney cancer Maternal Grandfather         Social History     Socioeconomic History    Marital status: Single   Tobacco Use    Smoking status: Never    Smokeless tobacco: Never   Vaping Use    Vaping Use: Never used   Substance and Sexual Activity    Alcohol use: Yes     Comment: occasionally    Drug use: Never    Sexual activity: Defer        Past Medical History:   Diagnosis Date    ADHD (attention deficit hyperactivity disorder)     Anxiety     Asthma     Depression     Kidney stone     Plantar wart     Seasonal allergies         Immunization History   Administered Date(s) Administered    COVID-19 (PFIZER) BIVALENT 12+YRS 10/14/2022    COVID-19 (PFIZER) Purple Cap Monovalent 04/10/2021, 05/01/2021, 11/02/2021    COVID-19 F23 (PFIZER) 12YRS+ (COMIRNATY) 10/24/2023    Flublok 18+yrs 09/30/2019, 10/22/2021, 10/14/2022, 10/24/2023    Fluzone Quad >6mos (Multi-dose) 10/10/2020  "        No Known Allergies       Current Outpatient Medications:     albuterol sulfate  (90 Base) MCG/ACT inhaler, Inhale 2 puffs Every 6 (Six) Hours As Needed for Wheezing., Disp: 18 g, Rfl: 1    atomoxetine (STRATTERA) 60 MG capsule, Take 1 capsule by mouth Daily., Disp: 30 capsule, Rfl: 5    azelastine (ASTELIN) 0.1 % nasal spray, 2 sprays into the nostril(s) as directed by provider 2 (Two) Times a Day. Use in each nostril as directed, Disp: 1 each, Rfl: 11    cetirizine (zyrTEC) 10 MG tablet, Take 1 tablet by mouth Daily., Disp: 30 tablet, Rfl: 1    Desvenlafaxine Succinate ER (Pristiq) 25 MG tablet sustained-release 24 hour, Take 1 tablet by mouth Daily., Disp: 30 tablet, Rfl: 1    Fluticasone-Umeclidin-Vilant (Trelegy Ellipta) 100-62.5-25 MCG/ACT inhaler, Inhale 1 puff Daily., Disp: 1 each, Rfl: 6    hydrOXYzine (ATARAX) 25 MG tablet, Take 1 tablet by mouth 3 (Three) Times a Day As Needed for Itching., Disp: 90 tablet, Rfl: 1    meloxicam (Mobic) 7.5 MG tablet, Take 1 tablet by mouth Daily., Disp: 30 tablet, Rfl: 5    montelukast (SINGULAIR) 10 MG tablet, Take 1 tablet by mouth Every Night., Disp: 90 tablet, Rfl: 3    Triamcinolone Acetonide (NASACORT) 55 MCG/ACT nasal inhaler, 2 sprays into the nostril(s) as directed by provider Daily., Disp: , Rfl:     predniSONE (DELTASONE) 20 MG tablet, Take 2 tablets by mouth Daily for 7 days., Disp: 14 tablet, Rfl: 2     Objective   Vital Signs:   /67 (BP Location: Left arm, Patient Position: Sitting, Cuff Size: Adult)   Pulse 89   Temp 98 °F (36.7 °C) (Temporal)   Resp 18   Ht 165.1 cm (65\")   Wt 113 kg (250 lb)   SpO2 97% Comment: Room air  BMI 41.60 kg/m²   Mallampatti classification : 1  Physical Exam      Vital Signs Reviewed  Obese pleasant female, in no acute distress, normal conversational  HEENT:  PERRL, EOMI.  OP, nares clear, no sinus tenderness  Neck:  Supple, no JVD, no thyromegaly  Lymph: no axillary, cervical, supraclavicular " lymphadenopathy noted bilaterally  Chest:  good aeration, bilateral diminished breath sounds, no wheezing, crackles or rhonchi, resonant to percussion b/l  CV: RRR, no MGR, pulses 2+, equal.  Abd:  Soft, NT, ND, + BS, no HSM  EXT:  no clubbing, no cyanosis, No BLE edema  Neuro:  A&Ox3, CN grossly intact, no focal deficits.  Skin: No rashes or lesions noted     Result Review :   The following data was reviewed by: Myles Galvan MD on 08/17/2023:  Common labs          5/15/2023    17:26 10/31/2023    12:05 11/22/2023    09:54   Common Labs   Glucose 95  102     BUN 7  6     Creatinine 0.68  0.70     Sodium 140  140     Potassium 3.9  4.4     Chloride 105  106     Calcium 9.0  9.4     Albumin 3.9  4.1     Total Bilirubin 0.3  0.6     Alkaline Phosphatase 78  83     AST (SGOT) 21  45     ALT (SGPT) 24  71     WBC 12.96  10.83  7.76    Hemoglobin 13.1  13.9  13.4    Hematocrit 38.8  40.3  39.8    Platelets 335  371  374      CMP          2/13/2023    08:23 5/15/2023    17:26 10/31/2023    12:05   CMP   Glucose 79  95  102    BUN 9  7  6    Creatinine 0.67  0.68  0.70    EGFR 125.3  124.1  123.3    Sodium 136  140  140    Potassium 4.2  3.9  4.4    Chloride 102  105  106    Calcium 9.4  9.0  9.4    Total Protein 7.4  7.1  6.8    Albumin 4.3  3.9  4.1    Globulin 3.1  3.2  2.7    Total Bilirubin 0.5  0.3  0.6    Alkaline Phosphatase 74  78  83    AST (SGOT) 23  21  45    ALT (SGPT) 23  24  71    Albumin/Globulin Ratio 1.4  1.2  1.5    BUN/Creatinine Ratio 13.4  10.3  8.6    Anion Gap 8.2  9.8  9.0      CBC          5/15/2023    17:26 10/31/2023    12:05 11/22/2023    09:54   CBC   WBC 12.96  10.83  7.76    RBC 4.53  4.66  4.66    Hemoglobin 13.1  13.9  13.4    Hematocrit 38.8  40.3  39.8    MCV 85.7  86.5  85.4    MCH 28.9  29.8  28.8    MCHC 33.8  34.5  33.7    RDW 14.0  13.8  13.2    Platelets 335  371  374      CBC w/diff          2/13/2023    08:23 5/15/2023    17:26   CBC w/Diff   WBC 9.71  12.96    RBC 4.81  4.53     Hemoglobin 13.6  13.1    Hematocrit 41.2  38.8    MCV 85.7  85.7    MCH 28.3  28.9    MCHC 33.0  33.8    RDW 13.0  14.0    Platelets 359  335    Neutrophil Rel % 67.4  70.5    Immature Granulocyte Rel % 0.7  0.3    Lymphocyte Rel % 22.5  16.0    Monocyte Rel % 7.7  6.9    Eosinophil Rel % 1.1  5.6    Basophil Rel % 0.6  0.7      Data reviewed : Radiologic studies previous chest x-rays reviewed.             Assessment and Plan    Diagnoses and all orders for this visit:    1. Seasonal allergic rhinitis, unspecified trigger (Primary)  -     albuterol sulfate  (90 Base) MCG/ACT inhaler; Inhale 2 puffs Every 6 (Six) Hours As Needed for Wheezing.  Dispense: 18 g; Refill: 1  -     azelastine (ASTELIN) 0.1 % nasal spray; 2 sprays into the nostril(s) as directed by provider 2 (Two) Times a Day. Use in each nostril as directed  Dispense: 1 each; Refill: 11  -     cetirizine (zyrTEC) 10 MG tablet; Take 1 tablet by mouth Daily.  Dispense: 30 tablet; Refill: 1  -     Fluticasone-Umeclidin-Vilant (Trelegy Ellipta) 100-62.5-25 MCG/ACT inhaler; Inhale 1 puff Daily.  Dispense: 1 each; Refill: 6  -     montelukast (SINGULAIR) 10 MG tablet; Take 1 tablet by mouth Every Night.  Dispense: 90 tablet; Refill: 3  -     predniSONE (DELTASONE) 20 MG tablet; Take 2 tablets by mouth Daily for 7 days.  Dispense: 14 tablet; Refill: 2  -     Complete PFT - Pre & Post Bronchodilator; Future  -     6 Minute Walk Test; Future  -     IgE Level; Future    2. Uncomplicated asthma, unspecified asthma severity, unspecified whether persistent  -     albuterol sulfate  (90 Base) MCG/ACT inhaler; Inhale 2 puffs Every 6 (Six) Hours As Needed for Wheezing.  Dispense: 18 g; Refill: 1  -     azelastine (ASTELIN) 0.1 % nasal spray; 2 sprays into the nostril(s) as directed by provider 2 (Two) Times a Day. Use in each nostril as directed  Dispense: 1 each; Refill: 11  -     cetirizine (zyrTEC) 10 MG tablet; Take 1 tablet by mouth Daily.  Dispense:  30 tablet; Refill: 1  -     Fluticasone-Umeclidin-Vilant (Trelegy Ellipta) 100-62.5-25 MCG/ACT inhaler; Inhale 1 puff Daily.  Dispense: 1 each; Refill: 6  -     montelukast (SINGULAIR) 10 MG tablet; Take 1 tablet by mouth Every Night.  Dispense: 90 tablet; Refill: 3  -     predniSONE (DELTASONE) 20 MG tablet; Take 2 tablets by mouth Daily for 7 days.  Dispense: 14 tablet; Refill: 2  -     Complete PFT - Pre & Post Bronchodilator; Future  -     6 Minute Walk Test; Future  -     IgE Level; Future    3. Uncomplicated asthma, unspecified asthma severity, unspecified whether persistent  Comments:  Symptoms well controlled with current medication regimen, cont. Current meds.  Orders:  -     albuterol sulfate  (90 Base) MCG/ACT inhaler; Inhale 2 puffs Every 6 (Six) Hours As Needed for Wheezing.  Dispense: 18 g; Refill: 1  -     azelastine (ASTELIN) 0.1 % nasal spray; 2 sprays into the nostril(s) as directed by provider 2 (Two) Times a Day. Use in each nostril as directed  Dispense: 1 each; Refill: 11  -     cetirizine (zyrTEC) 10 MG tablet; Take 1 tablet by mouth Daily.  Dispense: 30 tablet; Refill: 1  -     Fluticasone-Umeclidin-Vilant (Trelegy Ellipta) 100-62.5-25 MCG/ACT inhaler; Inhale 1 puff Daily.  Dispense: 1 each; Refill: 6  -     montelukast (SINGULAIR) 10 MG tablet; Take 1 tablet by mouth Every Night.  Dispense: 90 tablet; Refill: 3  -     predniSONE (DELTASONE) 20 MG tablet; Take 2 tablets by mouth Daily for 7 days.  Dispense: 14 tablet; Refill: 2  -     Complete PFT - Pre & Post Bronchodilator; Future  -     6 Minute Walk Test; Future  -     IgE Level; Future        Moderate persistent asthma with recurrent exacerbation:  Continue with Trelegy Ellipta 100 mcg sample to use.  Use albuterol as needed.  We will check pulmonary function test and 6-minute walk test.  Her serum IgE level was high.  Eosinophil level was high.  If symptoms are not controlled, may need Biologics in future.  Advised her to avoid  cat as much as possible.    Allergic rhinitis: Continue with Singulair once daily.  Continue with Zyrtec or Claritin as needed.    Postnasal drip: Continue with steroid nasal spray.  Continue with azelastine nasal spray.    Asthma exacerbation  Was discussed in detail: I have given her prednisone burst to use with 2 refills.    Follow Up   Return in about 6 months (around 6/7/2024).  Patient was given instructions and counseling regarding her condition or for health maintenance advice. Please see specific information pulled into the AVS if appropriate.        Electronically signed by Myles Galvan MD, 12/7/2023, 09:28 EST.

## 2023-12-28 ENCOUNTER — HOSPITAL ENCOUNTER (OUTPATIENT)
Dept: RESPIRATORY THERAPY | Facility: HOSPITAL | Age: 23
Discharge: HOME | End: 2023-12-28
Payer: COMMERCIAL

## 2023-12-28 DIAGNOSIS — J45.909 UNCOMPLICATED ASTHMA, UNSPECIFIED ASTHMA SEVERITY, UNSPECIFIED WHETHER PERSISTENT: ICD-10-CM

## 2023-12-28 DIAGNOSIS — J30.2 SEASONAL ALLERGIC RHINITIS, UNSPECIFIED TRIGGER: ICD-10-CM

## 2023-12-28 PROCEDURE — 94618 PULMONARY STRESS TESTING: CPT

## 2023-12-28 PROCEDURE — 94729 DIFFUSING CAPACITY: CPT

## 2023-12-28 PROCEDURE — 94060 EVALUATION OF WHEEZING: CPT

## 2023-12-28 PROCEDURE — 94726 PLETHYSMOGRAPHY LUNG VOLUMES: CPT

## 2023-12-28 RX ORDER — ALBUTEROL SULFATE 2.5 MG/3ML
2.5 SOLUTION RESPIRATORY (INHALATION) ONCE
Status: COMPLETED | OUTPATIENT
Start: 2023-12-28 | End: 2023-12-28

## 2023-12-28 RX ADMIN — ALBUTEROL SULFATE 2.5 MG: 2.5 SOLUTION RESPIRATORY (INHALATION) at 08:49

## 2023-12-28 NOTE — PROCEDURES
Exercise Oximetry    Patient Name:Sylvia Oliva   MRN: [id number]   Date: 12/28/23             ROOM AIR BASELINE   SpO2% 97   Heart Rate 100   Blood Pressure 122/76     EXERCISE ON ROOM AIR SpO2% EXERCISE ON O2 @  LPM SpO2%   1 MINUTE 96 1 MINUTE    2 MINUTES 96 2 MINUTES    3 MINUTES 97 3 MINUTES    4 MINUTES 96 4 MINUTES    5 MINUTES 96 5 MINUTES    6 MINUTES 96 6 MINUTES               Distance Walked  1200 Distance Walked   Dyspnea (Peter Scale)  0 Dyspnea (Peter Scale)   Fatigue (Peter Scale)  0 Fatigue (Peter Scale)   SpO2% Post Exercise  97 SpO2% Post Exercise   HR Post Exercise  98 HR Post Exercise   Time to Recovery  1 minute Time to Recovery     Comments:

## 2024-01-22 ENCOUNTER — OFFICE VISIT (OUTPATIENT)
Dept: FAMILY MEDICINE CLINIC | Facility: CLINIC | Age: 24
End: 2024-01-22
Payer: COMMERCIAL

## 2024-01-22 VITALS
HEART RATE: 89 BPM | TEMPERATURE: 97.5 F | WEIGHT: 253.8 LBS | BODY MASS INDEX: 42.28 KG/M2 | DIASTOLIC BLOOD PRESSURE: 65 MMHG | OXYGEN SATURATION: 97 % | SYSTOLIC BLOOD PRESSURE: 138 MMHG | HEIGHT: 65 IN

## 2024-01-22 DIAGNOSIS — F41.9 ANXIETY: ICD-10-CM

## 2024-01-22 DIAGNOSIS — F32.0 CURRENT MILD EPISODE OF MAJOR DEPRESSIVE DISORDER, UNSPECIFIED WHETHER RECURRENT: ICD-10-CM

## 2024-01-22 PROCEDURE — 99213 OFFICE O/P EST LOW 20 MIN: CPT | Performed by: NURSE PRACTITIONER

## 2024-01-22 RX ORDER — DESVENLAFAXINE 25 MG/1
25 TABLET, EXTENDED RELEASE ORAL DAILY
Qty: 30 TABLET | Refills: 1 | Status: SHIPPED | OUTPATIENT
Start: 2024-01-22

## 2024-01-22 NOTE — PROGRESS NOTES
Chief Complaint  Follow-up (2 months), Anxiety, and Depression    SUBJECTIVE  Sylvia Oliva presents to [facility] FAM[facility]IC[facility]    Anxiety/Depression:  Patient is taking Hydroxyzine, Pristiq - new medication at last office visit.  Patient states she can't tell a big difference in her depression symptoms since starting new medicaiton.  Patient does note side effect of migraine headaches .  Patient admits to missing several doses.  Patient denies suicidal ideations or thoughts of harming herself or others.    Patient states she just cannot remember to take the medication.  She has tried setting a phone alarm and she ignores it.  There are bouts where she skips 2 and 3 days in a row.  She was given 60 pills in early November and states she has a significant amount left and therefore she realizes she has missed more doses than she thought previously.  History of Present Illness  Past Medical History:   Diagnosis Date    ADHD (attention deficit hyperactivity disorder)     Anxiety     Asthma     Depression     Kidney stone     Plantar wart     Seasonal allergies       Family History   Problem Relation Age of Onset    Hypertension Mother     Hypertension Maternal Grandfather     Kidney cancer Maternal Grandfather       Past Surgical History:   Procedure Laterality Date    CYSTOSCOPY URETEROSCOPY Right 10/4/2021    Procedure: CYSTOSCOPY RIGHT URETEROSCOPY, RETROGRADE PYELOGRAM, LASERTRIPSY, STONE BASKET EXTRACTION AND STENT INSERTION;  Surgeon: Amanda Orona MD;  Location: [facility];  Service: Urology;  Laterality: Right;    ENDOSCOPY      MOUTH SURGERY          Current Outpatient Medications:     albuterol sulfate  (90 Base) MCG/ACT inhaler, Inhale 2 puffs Every 6 (Six) Hours As Needed for Wheezing., Disp: 18 g, Rfl: 1    atomoxetine (STRATTERA) 60 MG capsule, Take 1 capsule by mouth Daily., Disp: 30 capsule, Rfl: 5    azelastine (ASTELIN) 0.1 % nasal spray, 2 sprays into the  "nostril(s) as directed by provider 2 (Two) Times a Day. Use in each nostril as directed, Disp: 1 each, Rfl: 11    cetirizine (zyrTEC) 10 MG tablet, Take 1 tablet by mouth Daily., Disp: 30 tablet, Rfl: 1    Desvenlafaxine Succinate ER (Pristiq) 25 MG tablet sustained-release 24 hour, Take 1 tablet by mouth Daily., Disp: 30 tablet, Rfl: 1    Fluticasone-Umeclidin-Vilant (Trelegy Ellipta) 100-62.5-25 MCG/ACT inhaler, Inhale 1 puff Daily., Disp: 1 each, Rfl: 6    hydrOXYzine (ATARAX) 25 MG tablet, Take 1 tablet by mouth 3 (Three) Times a Day As Needed for Itching., Disp: 90 tablet, Rfl: 1    meloxicam (Mobic) 7.5 MG tablet, Take 1 tablet by mouth Daily., Disp: 30 tablet, Rfl: 5    montelukast (SINGULAIR) 10 MG tablet, Take 1 tablet by mouth Every Night., Disp: 90 tablet, Rfl: 3    Triamcinolone Acetonide (NASACORT) 55 MCG/ACT nasal inhaler, 2 sprays into the nostril(s) as directed by provider Daily., Disp: , Rfl:     OBJECTIVE  Vital Signs:   /65 (BP Location: Left arm, Patient Position: Sitting, Cuff Size: Large Adult)   Pulse 89   Temp 97.5 °F (36.4 °C) (Oral)   Ht 165.1 cm (65\")   Wt 115 kg (253 lb 12.8 oz)   SpO2 97%   BMI 42.23 kg/m²    Estimated body mass index is 42.23 kg/m² as calculated from the following:    Height as of this encounter: 165.1 cm (65\").    Weight as of this encounter: 115 kg (253 lb 12.8 oz).     Wt Readings from Last 3 Encounters:   01/22/24 115 kg (253 lb 12.8 oz)   12/07/23 113 kg (250 lb)   11/22/23 114 kg (252 lb 6.4 oz)     BP Readings from Last 3 Encounters:   01/22/24 138/65   12/07/23 115/67   11/22/23 122/64       Physical Exam  Vitals reviewed.   Constitutional:       Appearance: Normal appearance. She is well-developed.   HENT:      Head: Normocephalic and atraumatic.      Right Ear: External ear normal.      Left Ear: External ear normal.      Mouth/Throat:      Pharynx: No oropharyngeal exudate.   Eyes:      Conjunctiva/sclera: Conjunctivae normal.      Pupils: Pupils " are equal, round, and reactive to light.   Cardiovascular:      Rate and Rhythm: Normal rate and regular rhythm.      Pulses: Normal pulses.      Heart sounds: Normal heart sounds. No murmur heard.     No friction rub. No gallop.   Pulmonary:      Effort: Pulmonary effort is normal.      Breath sounds: Normal breath sounds. No wheezing or rhonchi.   Skin:     General: Skin is warm and dry.   Neurological:      Mental Status: She is alert and oriented to person, place, and time.      Cranial Nerves: No cranial nerve deficit.   Psychiatric:         Mood and Affect: Mood and affect normal.         Behavior: Behavior normal.         Thought Content: Thought content normal.         Judgment: Judgment normal.          Result Review          The above data has been reviewed by JEFF Patel 01/22/2024 08:28 EST.          Patient Care Team:  Christal Lim APRN as PCP - General (Family Medicine)  Amanda Orona MD as Consulting Physician (Urology)            ASSESSMENT & PLAN    Diagnoses and all orders for this visit:    1. Anxiety  Comments:  Start Pristiq 25 mg daily, side effect and administration addressed.  Continue hydroxyzine as needed.  Orders:  -     Desvenlafaxine Succinate ER (Pristiq) 25 MG tablet sustained-release 24 hour; Take 1 tablet by mouth Daily.  Dispense: 30 tablet; Refill: 1    2. Current mild episode of major depressive disorder, unspecified whether recurrent  Comments:  Start Pristiq 25 mg daily, side effect and administration addressed. Continue hydroxyzine as needed.  Orders:  -     Desvenlafaxine Succinate ER (Pristiq) 25 MG tablet sustained-release 24 hour; Take 1 tablet by mouth Daily.  Dispense: 30 tablet; Refill: 1     Compliance on medication addressed. Pt states understanding and will try better to take medication regularly.     Tobacco Use: Low Risk  (1/22/2024)    Patient History     Smoking Tobacco Use: Never     Smokeless Tobacco Use: Never     Passive Exposure: Not on  file       Follow Up     Return in about 6 weeks (around 3/4/2024).      Patient was given instructions and counseling regarding her condition or for health maintenance advice. Please see specific information pulled into the AVS if appropriate.   I have reviewed information obtained and documented by others and I have confirmed the accuracy of this documented note.    Christal Lim, APRN

## 2024-02-24 DIAGNOSIS — F90.0 ATTENTION DEFICIT HYPERACTIVITY DISORDER (ADHD), PREDOMINANTLY INATTENTIVE TYPE: ICD-10-CM

## 2024-02-28 RX ORDER — ATOMOXETINE 60 MG/1
60 CAPSULE ORAL DAILY
Qty: 30 CAPSULE | Refills: 0 | Status: SHIPPED | OUTPATIENT
Start: 2024-02-28

## 2024-03-18 DIAGNOSIS — F32.0 CURRENT MILD EPISODE OF MAJOR DEPRESSIVE DISORDER, UNSPECIFIED WHETHER RECURRENT: ICD-10-CM

## 2024-03-18 DIAGNOSIS — F41.9 ANXIETY: ICD-10-CM

## 2024-03-20 ENCOUNTER — LAB (OUTPATIENT)
Dept: LAB | Facility: HOSPITAL | Age: 24
End: 2024-03-20
Payer: COMMERCIAL

## 2024-03-20 ENCOUNTER — OFFICE VISIT (OUTPATIENT)
Dept: FAMILY MEDICINE CLINIC | Facility: CLINIC | Age: 24
End: 2024-03-20
Payer: COMMERCIAL

## 2024-03-20 VITALS
BODY MASS INDEX: 42.42 KG/M2 | DIASTOLIC BLOOD PRESSURE: 82 MMHG | HEIGHT: 65 IN | HEART RATE: 107 BPM | SYSTOLIC BLOOD PRESSURE: 142 MMHG | OXYGEN SATURATION: 98 % | WEIGHT: 254.6 LBS

## 2024-03-20 DIAGNOSIS — F32.0 CURRENT MILD EPISODE OF MAJOR DEPRESSIVE DISORDER, UNSPECIFIED WHETHER RECURRENT: ICD-10-CM

## 2024-03-20 DIAGNOSIS — E53.8 VITAMIN B12 DEFICIENCY: ICD-10-CM

## 2024-03-20 DIAGNOSIS — J30.2 SEASONAL ALLERGIC RHINITIS, UNSPECIFIED TRIGGER: ICD-10-CM

## 2024-03-20 DIAGNOSIS — R53.83 OTHER FATIGUE: ICD-10-CM

## 2024-03-20 DIAGNOSIS — J45.909 UNCOMPLICATED ASTHMA, UNSPECIFIED ASTHMA SEVERITY, UNSPECIFIED WHETHER PERSISTENT: ICD-10-CM

## 2024-03-20 DIAGNOSIS — F41.9 ANXIETY: Primary | ICD-10-CM

## 2024-03-20 LAB
25(OH)D3 SERPL-MCNC: 13 NG/ML (ref 30–100)
ALBUMIN SERPL-MCNC: 4.3 G/DL (ref 3.5–5.2)
ALBUMIN/GLOB SERPL: 1.5 G/DL
ALP SERPL-CCNC: 81 U/L (ref 39–117)
ALT SERPL W P-5'-P-CCNC: 41 U/L (ref 1–33)
ANION GAP SERPL CALCULATED.3IONS-SCNC: 11.4 MMOL/L (ref 5–15)
AST SERPL-CCNC: 27 U/L (ref 1–32)
BASOPHILS # BLD AUTO: 0.05 10*3/MM3 (ref 0–0.2)
BASOPHILS NFR BLD AUTO: 0.6 % (ref 0–1.5)
BILIRUB SERPL-MCNC: 0.9 MG/DL (ref 0–1.2)
BUN SERPL-MCNC: 9 MG/DL (ref 6–20)
BUN/CREAT SERPL: 12.3 (ref 7–25)
CALCIUM SPEC-SCNC: 9.4 MG/DL (ref 8.6–10.5)
CHLORIDE SERPL-SCNC: 105 MMOL/L (ref 98–107)
CHOLEST SERPL-MCNC: 146 MG/DL (ref 0–200)
CO2 SERPL-SCNC: 24.6 MMOL/L (ref 22–29)
CREAT SERPL-MCNC: 0.73 MG/DL (ref 0.57–1)
DEPRECATED RDW RBC AUTO: 39.5 FL (ref 37–54)
EGFRCR SERPLBLD CKD-EPI 2021: 117.2 ML/MIN/1.73
EOSINOPHIL # BLD AUTO: 0.2 10*3/MM3 (ref 0–0.4)
EOSINOPHIL NFR BLD AUTO: 2.3 % (ref 0.3–6.2)
ERYTHROCYTE [DISTWIDTH] IN BLOOD BY AUTOMATED COUNT: 12.9 % (ref 12.3–15.4)
GLOBULIN UR ELPH-MCNC: 2.9 GM/DL
GLUCOSE SERPL-MCNC: 99 MG/DL (ref 65–99)
HCT VFR BLD AUTO: 43.3 % (ref 34–46.6)
HDLC SERPL-MCNC: 59 MG/DL (ref 40–60)
HGB BLD-MCNC: 14.3 G/DL (ref 12–15.9)
IMM GRANULOCYTES # BLD AUTO: 0.03 10*3/MM3 (ref 0–0.05)
IMM GRANULOCYTES NFR BLD AUTO: 0.3 % (ref 0–0.5)
LDLC SERPL CALC-MCNC: 74 MG/DL (ref 0–100)
LDLC/HDLC SERPL: 1.25 {RATIO}
LYMPHOCYTES # BLD AUTO: 1.83 10*3/MM3 (ref 0.7–3.1)
LYMPHOCYTES NFR BLD AUTO: 21.2 % (ref 19.6–45.3)
MCH RBC QN AUTO: 28 PG (ref 26.6–33)
MCHC RBC AUTO-ENTMCNC: 33 G/DL (ref 31.5–35.7)
MCV RBC AUTO: 84.7 FL (ref 79–97)
MONOCYTES # BLD AUTO: 0.55 10*3/MM3 (ref 0.1–0.9)
MONOCYTES NFR BLD AUTO: 6.4 % (ref 5–12)
NEUTROPHILS NFR BLD AUTO: 5.98 10*3/MM3 (ref 1.7–7)
NEUTROPHILS NFR BLD AUTO: 69.2 % (ref 42.7–76)
NRBC BLD AUTO-RTO: 0 /100 WBC (ref 0–0.2)
PLATELET # BLD AUTO: 377 10*3/MM3 (ref 140–450)
PMV BLD AUTO: 9.5 FL (ref 6–12)
POTASSIUM SERPL-SCNC: 4.6 MMOL/L (ref 3.5–5.2)
PROT SERPL-MCNC: 7.2 G/DL (ref 6–8.5)
RBC # BLD AUTO: 5.11 10*6/MM3 (ref 3.77–5.28)
SODIUM SERPL-SCNC: 141 MMOL/L (ref 136–145)
TRIGL SERPL-MCNC: 67 MG/DL (ref 0–150)
TSH SERPL DL<=0.05 MIU/L-ACNC: 1.71 UIU/ML (ref 0.27–4.2)
VIT B12 BLD-MCNC: 374 PG/ML (ref 211–946)
VLDLC SERPL-MCNC: 13 MG/DL (ref 5–40)
WBC NRBC COR # BLD AUTO: 8.64 10*3/MM3 (ref 3.4–10.8)

## 2024-03-20 PROCEDURE — 80050 GENERAL HEALTH PANEL: CPT

## 2024-03-20 PROCEDURE — 36415 COLL VENOUS BLD VENIPUNCTURE: CPT

## 2024-03-20 PROCEDURE — 99214 OFFICE O/P EST MOD 30 MIN: CPT | Performed by: NURSE PRACTITIONER

## 2024-03-20 PROCEDURE — 82607 VITAMIN B-12: CPT

## 2024-03-20 PROCEDURE — 82306 VITAMIN D 25 HYDROXY: CPT

## 2024-03-20 PROCEDURE — 80061 LIPID PANEL: CPT

## 2024-03-20 RX ORDER — BUSPIRONE HYDROCHLORIDE 7.5 MG/1
7.5 TABLET ORAL 3 TIMES DAILY
Qty: 180 TABLET | Refills: 0 | Status: SHIPPED | OUTPATIENT
Start: 2024-03-20

## 2024-03-20 RX ORDER — MONTELUKAST SODIUM 10 MG/1
10 TABLET ORAL NIGHTLY
Qty: 90 TABLET | Refills: 1 | Status: SHIPPED | OUTPATIENT
Start: 2024-03-20

## 2024-03-20 RX ORDER — DESVENLAFAXINE 25 MG/1
25 TABLET, EXTENDED RELEASE ORAL DAILY
Qty: 90 TABLET | Refills: 0 | Status: SHIPPED | OUTPATIENT
Start: 2024-03-20

## 2024-03-20 NOTE — PROGRESS NOTES
Chief Complaint  Anxiety and depression    SUBJECTIVE  Sylvia Oliva presents to [facility] follow up with anxiety and depression.     Pt states Pristiq is working well for depression, but feels it is not doing a lot for her anxiety symptoms.  She was previously prescribed hydroxyzine as needed but did not like taking it as it made her drowsy.  History of Present Illness  Past Medical History:   Diagnosis Date    ADHD (attention deficit hyperactivity disorder)     Anxiety     Asthma     Depression     Kidney stone     Plantar wart     Seasonal allergies       Family History   Problem Relation Age of Onset    Hypertension Mother     Hypertension Maternal Grandfather     Kidney cancer Maternal Grandfather       Past Surgical History:   Procedure Laterality Date    CYSTOSCOPY URETEROSCOPY Right 10/4/2021    Procedure: CYSTOSCOPY RIGHT URETEROSCOPY, RETROGRADE PYELOGRAM, LASERTRIPSY, STONE BASKET EXTRACTION AND STENT INSERTION;  Surgeon: Amanda Orona MD;  Location: [facility];  Service: Urology;  Laterality: Right;    ENDOSCOPY      MOUTH SURGERY          Current Outpatient Medications:     albuterol sulfate  (90 Base) MCG/ACT inhaler, Inhale 2 puffs Every 6 (Six) Hours As Needed for Wheezing., Disp: 18 g, Rfl: 1    atomoxetine (STRATTERA) 60 MG capsule, TAKE 1 CAPSULE BY MOUTH DAILY, Disp: 30 capsule, Rfl: 0    azelastine (ASTELIN) 0.1 % nasal spray, 2 sprays into the nostril(s) as directed by provider 2 (Two) Times a Day. Use in each nostril as directed, Disp: 1 each, Rfl: 11    cetirizine (zyrTEC) 10 MG tablet, Take 1 tablet by mouth Daily., Disp: 30 tablet, Rfl: 1    Desvenlafaxine Succinate ER (Pristiq) 25 MG tablet sustained-release 24 hour, Take 1 tablet by mouth Daily., Disp: 30 tablet, Rfl: 1    Fluticasone-Umeclidin-Vilant (Trelegy Ellipta) 100-62.5-25 MCG/ACT inhaler, Inhale 1 puff Daily., Disp: 1 each, Rfl: 6    hydrOXYzine (ATARAX) 25 MG tablet, Take 1  "tablet by mouth 3 (Three) Times a Day As Needed for Itching., Disp: 90 tablet, Rfl: 1    meloxicam (Mobic) 7.5 MG tablet, Take 1 tablet by mouth Daily., Disp: 30 tablet, Rfl: 5    montelukast (SINGULAIR) 10 MG tablet, Take 1 tablet by mouth Every Night., Disp: 90 tablet, Rfl: 1    Triamcinolone Acetonide (NASACORT) 55 MCG/ACT nasal inhaler, 2 sprays into the nostril(s) as directed by provider Daily., Disp: , Rfl:     busPIRone (BUSPAR) 7.5 MG tablet, Take 1 tablet by mouth 3 (Three) Times a Day., Disp: 180 tablet, Rfl: 0    OBJECTIVE  Vital Signs:   /82   Pulse 107   Ht 165.1 cm (65\")   Wt 115 kg (254 lb 9.6 oz)   LMP 03/12/2024   SpO2 98%   BMI 42.37 kg/m²    Estimated body mass index is 42.37 kg/m² as calculated from the following:    Height as of this encounter: 165.1 cm (65\").    Weight as of this encounter: 115 kg (254 lb 9.6 oz).     Wt Readings from Last 3 Encounters:   03/20/24 115 kg (254 lb 9.6 oz)   01/22/24 115 kg (253 lb 12.8 oz)   12/07/23 113 kg (250 lb)     BP Readings from Last 3 Encounters:   03/20/24 142/82   01/22/24 138/65   12/07/23 115/67       Physical Exam  Vitals reviewed.   Constitutional:       Appearance: Normal appearance. She is well-developed.   HENT:      Head: Normocephalic and atraumatic.      Right Ear: External ear normal.      Left Ear: External ear normal.      Mouth/Throat:      Pharynx: No oropharyngeal exudate.   Eyes:      Conjunctiva/sclera: Conjunctivae normal.      Pupils: Pupils are equal, round, and reactive to light.   Neck:      Vascular: No carotid bruit.   Cardiovascular:      Rate and Rhythm: Normal rate and regular rhythm.      Pulses: Normal pulses.      Heart sounds: Normal heart sounds. No murmur heard.     No friction rub. No gallop.   Pulmonary:      Effort: Pulmonary effort is normal.      Breath sounds: Normal breath sounds. No wheezing or rhonchi.   Skin:     General: Skin is warm and dry.   Neurological:      Mental Status: She is alert and " oriented to person, place, and time.      Cranial Nerves: No cranial nerve deficit.   Psychiatric:         Mood and Affect: Mood and affect normal.         Behavior: Behavior normal.         Thought Content: Thought content normal.         Judgment: Judgment normal.          Result Review            The above data has been reviewed by JEFF Patle 03/20/2024 09:53 EDT.          Patient Care Team:  Christal Lim APRN as PCP - General (Family Medicine)  Amanda Orona MD as Consulting Physician (Urology)    Class 3 Severe Obesity (BMI >=40). Obesity-related health conditions include the following: none. Obesity is unchanged. BMI is is above average; BMI management plan is completed. We discussed portion control and increasing exercise.       ASSESSMENT & PLAN    Diagnoses and all orders for this visit:    1. Anxiety (Primary)  Comments:  Continue Pristiq 25 mg daily, add BuSpar twice daily, side effects and administration addressed.  Orders:  -     busPIRone (BUSPAR) 7.5 MG tablet; Take 1 tablet by mouth 3 (Three) Times a Day.  Dispense: 180 tablet; Refill: 0    2. Seasonal allergic rhinitis, unspecified trigger  Comments:  Symptoms well controlled with current medication regimen, cont. Current meds.  Orders:  -     montelukast (SINGULAIR) 10 MG tablet; Take 1 tablet by mouth Every Night.  Dispense: 90 tablet; Refill: 1    3. Uncomplicated asthma, unspecified asthma severity, unspecified whether persistent  Comments:  Symptoms well controlled with current medication regimen, cont. Current meds.  Orders:  -     montelukast (SINGULAIR) 10 MG tablet; Take 1 tablet by mouth Every Night.  Dispense: 90 tablet; Refill: 1    4. Vitamin B12 deficiency  -     Vitamin B12; Future    5. Other fatigue  Comments:  Check lab  Orders:  -     Vitamin D,25-Hydroxy; Future  -     Comprehensive Metabolic Panel; Future  -     CBC & Differential; Future  -     Lipid Panel; Future  -     TSH Rfx On Abnormal To Free T4;  Future    6. Current mild episode of major depressive disorder, unspecified whether recurrent  Comments:  Continue Pristiq 25 mg daily, add BuSpar twice daily, side effects and administration addressed.         Tobacco Use: Low Risk  (3/20/2024)    Patient History     Smoking Tobacco Use: Never     Smokeless Tobacco Use: Never     Passive Exposure: Not on file       Follow Up     Return in about 2 months (around 5/20/2024).        Patient was given instructions and counseling regarding her condition or for health maintenance advice. Please see specific information pulled into the AVS if appropriate.   I have reviewed information obtained and documented by others and I have confirmed the accuracy of this documented note.    Christal Lim, APRN

## 2024-03-22 RX ORDER — ERGOCALCIFEROL 1.25 MG/1
50000 CAPSULE ORAL WEEKLY
Qty: 12 CAPSULE | Refills: 1 | Status: SHIPPED | OUTPATIENT
Start: 2024-03-22

## 2024-04-01 DIAGNOSIS — F90.0 ATTENTION DEFICIT HYPERACTIVITY DISORDER (ADHD), PREDOMINANTLY INATTENTIVE TYPE: ICD-10-CM

## 2024-04-01 DIAGNOSIS — F41.9 ANXIETY: ICD-10-CM

## 2024-04-01 DIAGNOSIS — F32.0 CURRENT MILD EPISODE OF MAJOR DEPRESSIVE DISORDER, UNSPECIFIED WHETHER RECURRENT: ICD-10-CM

## 2024-04-01 RX ORDER — DESVENLAFAXINE 25 MG/1
25 TABLET, EXTENDED RELEASE ORAL DAILY
Qty: 90 TABLET | Refills: 0 | OUTPATIENT
Start: 2024-04-01

## 2024-04-01 RX ORDER — BUSPIRONE HYDROCHLORIDE 7.5 MG/1
7.5 TABLET ORAL 3 TIMES DAILY
Qty: 270 TABLET | Refills: 0 | OUTPATIENT
Start: 2024-04-01

## 2024-04-01 RX ORDER — ATOMOXETINE 60 MG/1
60 CAPSULE ORAL DAILY
Qty: 30 CAPSULE | Refills: 0 | OUTPATIENT
Start: 2024-04-01

## 2024-04-01 NOTE — TELEPHONE ENCOUNTER
Too soon for refills.  Spoke with patient, she states she does not need any of these medications refilled at this time.

## 2024-05-16 DIAGNOSIS — F41.9 ANXIETY: ICD-10-CM

## 2024-05-17 RX ORDER — BUSPIRONE HYDROCHLORIDE 7.5 MG/1
7.5 TABLET ORAL 3 TIMES DAILY
Qty: 180 TABLET | Refills: 0 | Status: SHIPPED | OUTPATIENT
Start: 2024-05-17

## 2024-05-20 DIAGNOSIS — J30.2 SEASONAL ALLERGIC RHINITIS, UNSPECIFIED TRIGGER: ICD-10-CM

## 2024-05-20 DIAGNOSIS — J45.909 UNCOMPLICATED ASTHMA, UNSPECIFIED ASTHMA SEVERITY, UNSPECIFIED WHETHER PERSISTENT: ICD-10-CM

## 2024-05-20 RX ORDER — CETIRIZINE HYDROCHLORIDE 10 MG/1
10 TABLET ORAL DAILY
Qty: 30 TABLET | Refills: 3 | Status: SHIPPED | OUTPATIENT
Start: 2024-05-20

## 2024-06-03 ENCOUNTER — LAB (OUTPATIENT)
Dept: LAB | Facility: HOSPITAL | Age: 24
End: 2024-06-03
Payer: COMMERCIAL

## 2024-06-03 ENCOUNTER — OFFICE VISIT (OUTPATIENT)
Dept: FAMILY MEDICINE CLINIC | Facility: CLINIC | Age: 24
End: 2024-06-03
Payer: COMMERCIAL

## 2024-06-03 VITALS
OXYGEN SATURATION: 100 % | DIASTOLIC BLOOD PRESSURE: 73 MMHG | SYSTOLIC BLOOD PRESSURE: 129 MMHG | HEART RATE: 92 BPM | HEIGHT: 65 IN | WEIGHT: 253.6 LBS | BODY MASS INDEX: 42.25 KG/M2

## 2024-06-03 DIAGNOSIS — F90.0 ATTENTION DEFICIT HYPERACTIVITY DISORDER (ADHD), PREDOMINANTLY INATTENTIVE TYPE: ICD-10-CM

## 2024-06-03 DIAGNOSIS — J30.2 SEASONAL ALLERGIC RHINITIS, UNSPECIFIED TRIGGER: ICD-10-CM

## 2024-06-03 DIAGNOSIS — F41.9 ANXIETY: Primary | ICD-10-CM

## 2024-06-03 DIAGNOSIS — F32.0 CURRENT MILD EPISODE OF MAJOR DEPRESSIVE DISORDER, UNSPECIFIED WHETHER RECURRENT: ICD-10-CM

## 2024-06-03 DIAGNOSIS — E55.9 VITAMIN D DEFICIENCY: ICD-10-CM

## 2024-06-03 DIAGNOSIS — Z23 IMMUNIZATION DUE: ICD-10-CM

## 2024-06-03 LAB — 25(OH)D3 SERPL-MCNC: 34.5 NG/ML (ref 30–100)

## 2024-06-03 PROCEDURE — 99214 OFFICE O/P EST MOD 30 MIN: CPT | Performed by: NURSE PRACTITIONER

## 2024-06-03 PROCEDURE — 90471 IMMUNIZATION ADMIN: CPT | Performed by: NURSE PRACTITIONER

## 2024-06-03 PROCEDURE — 90651 9VHPV VACCINE 2/3 DOSE IM: CPT | Performed by: NURSE PRACTITIONER

## 2024-06-03 PROCEDURE — 36415 COLL VENOUS BLD VENIPUNCTURE: CPT

## 2024-06-03 PROCEDURE — 82306 VITAMIN D 25 HYDROXY: CPT

## 2024-06-03 RX ORDER — ERGOCALCIFEROL 1.25 MG/1
50000 CAPSULE ORAL WEEKLY
Qty: 12 CAPSULE | Refills: 1 | Status: SHIPPED | OUTPATIENT
Start: 2024-06-03

## 2024-06-03 RX ORDER — ATOMOXETINE 60 MG/1
60 CAPSULE ORAL DAILY
Qty: 30 CAPSULE | Refills: 5 | Status: SHIPPED | OUTPATIENT
Start: 2024-06-03

## 2024-06-03 NOTE — PROGRESS NOTES
Chief Complaint  Follow-up, Anxiety, Vitamin D Deficiency, Asthma, and Allergies    SUBJECTIVE  Sylvia Oliva presents to [facility] 2 month follow up.     Pt states she's doing well with medication.        History of Present Illness  Past Medical History:   Diagnosis Date    ADHD (attention deficit hyperactivity disorder)     Anxiety     Asthma     Depression     Kidney stone     Plantar wart     Seasonal allergies       Family History   Problem Relation Age of Onset    Hypertension Mother     Hypertension Maternal Grandfather     Kidney cancer Maternal Grandfather       Past Surgical History:   Procedure Laterality Date    CYSTOSCOPY URETEROSCOPY Right 10/4/2021    Procedure: CYSTOSCOPY RIGHT URETEROSCOPY, RETROGRADE PYELOGRAM, LASERTRIPSY, STONE BASKET EXTRACTION AND STENT INSERTION;  Surgeon: Amanda Orona MD;  Location: [facility];  Service: Urology;  Laterality: Right;    ENDOSCOPY      MOUTH SURGERY          Current Outpatient Medications:     albuterol sulfate  (90 Base) MCG/ACT inhaler, Inhale 2 puffs Every 6 (Six) Hours As Needed for Wheezing., Disp: 18 g, Rfl: 1    atomoxetine (STRATTERA) 60 MG capsule, Take 1 capsule by mouth Daily., Disp: 30 capsule, Rfl: 5    azelastine (ASTELIN) 0.1 % nasal spray, 2 sprays into the nostril(s) as directed by provider 2 (Two) Times a Day. Use in each nostril as directed, Disp: 1 each, Rfl: 11    busPIRone (BUSPAR) 7.5 MG tablet, TAKE 1 TABLET BY MOUTH 3 TIMES A DAY, Disp: 180 tablet, Rfl: 0    cetirizine (zyrTEC) 10 MG tablet, TAKE 1 TABLET BY MOUTH DAILY, Disp: 30 tablet, Rfl: 3    Desvenlafaxine Succinate ER 25 MG tablet sustained-release 24 hour, TAKE 1 TABLET BY MOUTH DAILY, Disp: 90 tablet, Rfl: 0    Fluticasone-Umeclidin-Vilant (Trelegy Ellipta) 100-62.5-25 MCG/ACT inhaler, Inhale 1 puff Daily., Disp: 1 each, Rfl: 6    hydrOXYzine (ATARAX) 25 MG tablet, Take 1 tablet by mouth 3 (Three) Times a Day As Needed for  "Itching., Disp: 90 tablet, Rfl: 1    meloxicam (Mobic) 7.5 MG tablet, Take 1 tablet by mouth Daily., Disp: 30 tablet, Rfl: 5    montelukast (SINGULAIR) 10 MG tablet, Take 1 tablet by mouth Every Night., Disp: 90 tablet, Rfl: 1    Triamcinolone Acetonide (NASACORT) 55 MCG/ACT nasal inhaler, 2 sprays into the nostril(s) as directed by provider Daily., Disp: , Rfl:     vitamin D (ERGOCALCIFEROL) 1.25 MG (96385 UT) capsule capsule, Take 1 capsule by mouth 1 (One) Time Per Week., Disp: 12 capsule, Rfl: 1    OBJECTIVE  Vital Signs:   /73   Pulse 92   Ht 165.1 cm (65\")   Wt 115 kg (253 lb 9.6 oz)   LMP 05/30/2024   SpO2 100%   BMI 42.20 kg/m²    Estimated body mass index is 42.2 kg/m² as calculated from the following:    Height as of this encounter: 165.1 cm (65\").    Weight as of this encounter: 115 kg (253 lb 9.6 oz).     Wt Readings from Last 3 Encounters:   06/03/24 115 kg (253 lb 9.6 oz)   03/20/24 115 kg (254 lb 9.6 oz)   01/22/24 115 kg (253 lb 12.8 oz)     BP Readings from Last 3 Encounters:   06/03/24 129/73   03/20/24 142/82   01/22/24 138/65       Physical Exam  Vitals reviewed.   Constitutional:       Appearance: Normal appearance. She is well-developed.   HENT:      Head: Normocephalic and atraumatic.      Right Ear: External ear normal.      Left Ear: External ear normal.      Mouth/Throat:      Pharynx: No oropharyngeal exudate.   Eyes:      Conjunctiva/sclera: Conjunctivae normal.      Pupils: Pupils are equal, round, and reactive to light.   Cardiovascular:      Rate and Rhythm: Normal rate and regular rhythm.      Pulses: Normal pulses.      Heart sounds: Normal heart sounds. No murmur heard.     No friction rub. No gallop.   Pulmonary:      Effort: Pulmonary effort is normal.      Breath sounds: Normal breath sounds. No wheezing or rhonchi.   Skin:     General: Skin is warm and dry.   Neurological:      Mental Status: She is alert and oriented to person, place, and time.      Cranial Nerves: " No cranial nerve deficit.   Psychiatric:         Mood and Affect: Mood and affect normal.         Behavior: Behavior normal.         Thought Content: Thought content normal.         Judgment: Judgment normal.          Result Review        No Images in the past 120 days found..     The above data has been reviewed by JEFF Patel 06/03/2024 08:07 EDT.          Patient Care Team:  Christal Lim APRN as PCP - General (Family Medicine)  Amanda Orona MD as Consulting Physician (Urology)  Myles Galvan MD as Consulting Physician (Pulmonary Disease)            ASSESSMENT & PLAN    Diagnoses and all orders for this visit:    1. Anxiety (Primary)  Comments:  Continue Pristiq, patient will seek out psychiatric provider for additional management.    2. Attention deficit hyperactivity disorder (ADHD), predominantly inattentive type  Comments:  Doing well on Strattera, continue medication, patient will seek out psychiatric provider for additional management.  Orders:  -     atomoxetine (STRATTERA) 60 MG capsule; Take 1 capsule by mouth Daily.  Dispense: 30 capsule; Refill: 5    3. Vitamin D deficiency  Comments:  Will check labs, continue supplement  Orders:  -     vitamin D (ERGOCALCIFEROL) 1.25 MG (05537 UT) capsule capsule; Take 1 capsule by mouth 1 (One) Time Per Week.  Dispense: 12 capsule; Refill: 1  -     Vitamin D,25-Hydroxy; Future    4. Seasonal allergic rhinitis, unspecified trigger  Comments:  Continue current medications and follow-up with pulmonology provider    5. Current mild episode of major depressive disorder, unspecified whether recurrent  Comments:  Continue Pristiq, patient will seek out psychiatric provider for additional management.    6. Immunization due  Comments:  Given first dose of HPV vaccine today.  Advised she will need second dose in 1 to 2 months and final dose at 6 months.  Orders:  -     HPV Vaccine (HPV9)     “Discussed risks/benefits to vaccination, reviewed components  of the vaccine, discussed VIS, discussed informed consent, informed consent obtained. Patient/Parent was allowed to accept or refuse vaccine. Questions answered to satisfactory state of patient/Parent. We reviewed typical age appropriate and seasonally appropriate vaccinations. Reviewed immunization history and updated state vaccination form as needed. Patient was counseled on HPV      Tobacco Use: Low Risk  (6/3/2024)    Patient History     Smoking Tobacco Use: Never     Smokeless Tobacco Use: Never     Passive Exposure: Not on file       Follow Up     Return for Next scheduled follow up.        Patient was given instructions and counseling regarding her condition or for health maintenance advice. Please see specific information pulled into the AVS if appropriate.   I have reviewed information obtained and documented by others and I have confirmed the accuracy of this documented note.    Christal Lim APRN

## 2024-06-13 ENCOUNTER — OFFICE VISIT (OUTPATIENT)
Dept: PULMONOLOGY | Facility: CLINIC | Age: 24
End: 2024-06-13
Payer: COMMERCIAL

## 2024-06-13 VITALS
TEMPERATURE: 98 F | RESPIRATION RATE: 18 BRPM | DIASTOLIC BLOOD PRESSURE: 84 MMHG | OXYGEN SATURATION: 96 % | HEART RATE: 107 BPM | HEIGHT: 65 IN | SYSTOLIC BLOOD PRESSURE: 126 MMHG | WEIGHT: 253 LBS | BODY MASS INDEX: 42.15 KG/M2

## 2024-06-13 DIAGNOSIS — J30.2 SEASONAL ALLERGIC RHINITIS, UNSPECIFIED TRIGGER: ICD-10-CM

## 2024-06-13 DIAGNOSIS — J45.909 UNCOMPLICATED ASTHMA, UNSPECIFIED ASTHMA SEVERITY, UNSPECIFIED WHETHER PERSISTENT: Primary | ICD-10-CM

## 2024-06-13 NOTE — PROGRESS NOTES
Primary Care Provider  Christal Lim, JEFF   Referring Provider  No ref. provider found    Patient Complaint  Follow-up (6 Months) and Seasonal allergic rhinitis, unspecified trigger      SUBJECTIVE    History of Presenting Illness  Sylvia Oliva is a pleasant 24 y.o. female who presents to [facility] for follow-up appointment.  Patient last saw Dr. Galvan 12/7/2023.  Patient is here for continued management of asthma.  [facility] and gets weekly allergy shots.  Patient states sometimes she goes weeks without using her albuterol inhaler.  Patient continues daily on Singulair Zyrtec azelastine nasal spray.  Patient states she has not been using Trelegy as she does not tolerate the taste and the powder.  She did get her PFT and 6-minute walk done which were normal.  Patient states she feels it is more allergy related.  She has not had a methacholine challenge.  At present time she denies dyspnea, coughing, wheezing, headaches, chest pain, weight loss or hemoptysis. Denies fevers, chills and night sweats. Sylvia Oliva is able to perform ADLs without difficulties and denies any swollen glands/lymph nodes in the head or neck.    I have personally reviewed the review of systems, past family, social, medical and surgical histories; and agree with their findings.    Review of Systems  Constitutional symptoms:  Denied complaints   Ear, nose, throat: Denied complaints  Cardiovascular:  Denied complaints  Respiratory: Denied complaints  Gastrointestinal: Denied complaints  Musculoskeletal: Denied complaints    Family History   Problem Relation Age of Onset    Hypertension Mother     Asthma Mother     Hypertension Maternal Grandfather     Kidney cancer Maternal Grandfather     Cancer Maternal Grandfather         kidney    Cancer Maternal Grandmother         esophageal    Cancer Maternal Aunt         colon        Social History      Socioeconomic History    Marital status: Single   Tobacco Use    Smoking status: Never    Smokeless tobacco: Never   Vaping Use    Vaping status: Never Used   Substance and Sexual Activity    Alcohol use: Yes     Alcohol/week: 2.0 standard drinks of alcohol     Types: 1 Glasses of wine, 1 Shots of liquor per week     Comment: I drink very infrequently but I guess it averages out    Drug use: Never    Sexual activity: Never        Past Medical History:   Diagnosis Date    ADHD (attention deficit hyperactivity disorder)     Allergic rhinitis     Anxiety     Asthma     Asthma, extrinsic     Asthma, intrinsic     Depression     Kidney stone     Plantar wart     Pneumonia     Seasonal allergies         Immunization History   Administered Date(s) Administered    COVID-19 (PFIZER) BIVALENT 12+YRS 10/14/2022    COVID-19 (PFIZER) Purple Cap Monovalent 04/10/2021, 05/01/2021, 11/02/2021    COVID-19 F23 (PFIZER) 12YRS+ (COMIRNATY) 10/24/2023    Flublok 18+yrs 09/30/2019, 10/22/2021, 10/14/2022, 10/24/2023    Fluzone Quad >6mos (Multi-dose) 10/10/2020    Hpv9 06/03/2024       No Known Allergies       Current Outpatient Medications:     albuterol sulfate  (90 Base) MCG/ACT inhaler, Inhale 2 puffs Every 6 (Six) Hours As Needed for Wheezing., Disp: 18 g, Rfl: 1    atomoxetine (STRATTERA) 60 MG capsule, Take 1 capsule by mouth Daily., Disp: 30 capsule, Rfl: 5    azelastine (ASTELIN) 0.1 % nasal spray, 2 sprays into the nostril(s) as directed by provider 2 (Two) Times a Day. Use in each nostril as directed, Disp: 1 each, Rfl: 11    busPIRone (BUSPAR) 7.5 MG tablet, TAKE 1 TABLET BY MOUTH 3 TIMES A DAY, Disp: 180 tablet, Rfl: 0    cetirizine (zyrTEC) 10 MG tablet, TAKE 1 TABLET BY MOUTH DAILY, Disp: 30 tablet, Rfl: 3    Desvenlafaxine Succinate ER 25 MG tablet sustained-release 24 hour, TAKE 1 TABLET BY MOUTH DAILY, Disp: 90 tablet, Rfl: 0    hydrOXYzine (ATARAX) 25 MG tablet, Take 1 tablet by mouth 3 (Three) Times a Day  "As Needed for Itching., Disp: 90 tablet, Rfl: 1    meloxicam (Mobic) 7.5 MG tablet, Take 1 tablet by mouth Daily., Disp: 30 tablet, Rfl: 5    montelukast (SINGULAIR) 10 MG tablet, Take 1 tablet by mouth Every Night., Disp: 90 tablet, Rfl: 1    Triamcinolone Acetonide (NASACORT) 55 MCG/ACT nasal inhaler, 2 sprays into the nostril(s) as directed by provider Daily., Disp: , Rfl:     vitamin D (ERGOCALCIFEROL) 1.25 MG (35206 UT) capsule capsule, Take 1 capsule by mouth 1 (One) Time Per Week., Disp: 12 capsule, Rfl: 1           Vital Signs   /84 (BP Location: Left arm, Patient Position: Sitting, Cuff Size: Adult)   Pulse 107   Temp 98 °F (36.7 °C) (Temporal)   Resp 18   Ht 165.1 cm (65\")   Wt 115 kg (253 lb)   SpO2 96% Comment: Room air  BMI 42.10 kg/m²       OBJECTIVE    Physical Exam  Vital Signs Reviewed   WDWN, Alert, NAD.    HEENT:  PERRL, EOMI.  OP, nares clear  Neck:  Supple, no JVD, no thyromegaly  Chest:  good aeration, clear to auscultation bilaterally, tympanic to percussion bilaterally, no work of breathing noted  CV: RRR, no MGR, pulses 2+, equal.  Abd:  Soft, NT, ND, + BS, no HSM  EXT:  no clubbing, no cyanosis, no edema  Neuro:  A&Ox3, CN grossly intact, no focal deficits.  Skin: No rashes or lesions noted    Results Review  I have personally reviewed the prior office notes, hospital records, labs, and diagnostics.    ASSESSMENT         Patient Active Problem List   Diagnosis    Abnormal bone density screening    Asthma    Depression    Plantar wart    Seasonal allergic rhinitis    Vitamin D deficiency       Encounter Diagnoses   Name Primary?    Uncomplicated asthma, unspecified asthma severity, unspecified whether persistent Yes    Seasonal allergic rhinitis, unspecified trigger       PLAN  -Bronchial methacholine challenge ordered  -stopping Trelegy at this time due to intolerance of medication  -Patient to continue with allergy medications, allergy shots and albuterol inhaler as " needed  -Patient will follow-up after bronchial methacholine challenge to review results      Diagnoses and all orders for this visit:    1. Uncomplicated asthma, unspecified asthma severity, unspecified whether persistent (Primary)  -     Bronchial Challenge With Methacholine; Future    2. Seasonal allergic rhinitis, unspecified trigger           Smoking status: Never  Vaccination status: Reviewed  Medications personally reviewed    Follow Up  Return in about 5 weeks (around 7/18/2024).    Patient was given instructions and counseling regarding her condition or for health maintenance advice. Please see specific information pulled into the AVS if appropriate.      I spent 15 minutes caring for Sylvia Oliva on this date of service. This time includes time spent by me in the following activities:preparing for the visit, reviewing tests, obtaining and/or reviewing a separately obtained history, performing a medically appropriate examination and/or evaluation, counseling and educating the patient/family/caregiver, ordering medications, tests, or procedures, documenting information in the medical record, independently interpreting results and communicating that information with the patient/family/caregiver and answered questions family members, discuss medications.

## 2024-06-15 DIAGNOSIS — F41.9 ANXIETY: ICD-10-CM

## 2024-06-15 DIAGNOSIS — F32.0 CURRENT MILD EPISODE OF MAJOR DEPRESSIVE DISORDER, UNSPECIFIED WHETHER RECURRENT: ICD-10-CM

## 2024-06-17 RX ORDER — DESVENLAFAXINE 25 MG/1
25 TABLET, EXTENDED RELEASE ORAL DAILY
Qty: 90 TABLET | Refills: 1 | Status: SHIPPED | OUTPATIENT
Start: 2024-06-17

## 2024-06-24 DIAGNOSIS — M54.9 BACK PAIN, UNSPECIFIED BACK LOCATION, UNSPECIFIED BACK PAIN LATERALITY, UNSPECIFIED CHRONICITY: ICD-10-CM

## 2024-06-25 RX ORDER — MELOXICAM 7.5 MG/1
7.5 TABLET ORAL DAILY
Qty: 90 TABLET | Refills: 0 | Status: SHIPPED | OUTPATIENT
Start: 2024-06-25

## 2024-07-22 DIAGNOSIS — J45.909 UNCOMPLICATED ASTHMA, UNSPECIFIED ASTHMA SEVERITY, UNSPECIFIED WHETHER PERSISTENT: Primary | ICD-10-CM

## 2024-09-06 RX ORDER — METHACHOLINE CHLORIDE 0.75/3ML
3 VIAL, NEBULIZER (ML) INHALATION
Status: COMPLETED | OUTPATIENT
Start: 2024-09-13 | End: 2024-09-13

## 2024-09-06 RX ORDER — SODIUM CHLORIDE FOR INHALATION 0.9 %
3 VIAL, NEBULIZER (ML) INHALATION ONCE AS NEEDED
Status: DISCONTINUED | OUTPATIENT
Start: 2024-09-13 | End: 2024-09-14 | Stop reason: HOSPADM

## 2024-09-06 RX ORDER — ALBUTEROL SULFATE 0.83 MG/ML
2.5 SOLUTION RESPIRATORY (INHALATION) ONCE
Status: COMPLETED | OUTPATIENT
Start: 2024-09-13 | End: 2024-09-13

## 2024-09-06 RX ORDER — METHACHOLINE CHLORIDE 3 MG/3 ML
3 VIAL, NEBULIZER (ML) INHALATION
Status: COMPLETED | OUTPATIENT
Start: 2024-09-13 | End: 2024-09-13

## 2024-09-06 RX ORDER — METHACHOLINE CHLORIDE 0 MG/3 ML
3 VIAL, NEBULIZER (ML) INHALATION
Status: COMPLETED | OUTPATIENT
Start: 2024-09-13 | End: 2024-09-13

## 2024-09-06 RX ORDER — METHACHOLINE CHLORIDE 48 MG/3 ML
3 VIAL, NEBULIZER (ML) INHALATION
Status: ACTIVE | OUTPATIENT
Start: 2024-09-13 | End: 2024-09-13

## 2024-09-06 RX ORDER — ALBUTEROL SULFATE 0.83 MG/ML
2.5 SOLUTION RESPIRATORY (INHALATION) ONCE
Status: DISCONTINUED | OUTPATIENT
Start: 2024-09-06 | End: 2024-09-06

## 2024-09-06 RX ORDER — METHACHOLINE CHLORIDE 12 MG/3 ML
3 VIAL, NEBULIZER (ML) INHALATION
Status: COMPLETED | OUTPATIENT
Start: 2024-09-13 | End: 2024-09-13

## 2024-09-06 RX ORDER — METHACHOLINE CHLORIDE 0.1875/3ML
3 VIAL, NEBULIZER (ML) INHALATION
Status: COMPLETED | OUTPATIENT
Start: 2024-09-13 | End: 2024-09-13

## 2024-09-06 RX ORDER — ALBUTEROL SULFATE 0.83 MG/ML
2.5 SOLUTION RESPIRATORY (INHALATION) ONCE AS NEEDED
Status: DISCONTINUED | OUTPATIENT
Start: 2024-09-13 | End: 2024-09-14 | Stop reason: HOSPADM

## 2024-09-13 ENCOUNTER — HOSPITAL ENCOUNTER (OUTPATIENT)
Dept: RESPIRATORY THERAPY | Facility: HOSPITAL | Age: 24
Discharge: HOME | End: 2024-09-13
Payer: COMMERCIAL

## 2024-09-13 DIAGNOSIS — J45.909 UNCOMPLICATED ASTHMA, UNSPECIFIED ASTHMA SEVERITY, UNSPECIFIED WHETHER PERSISTENT: ICD-10-CM

## 2024-09-13 PROCEDURE — 94070 EVALUATION OF WHEEZING: CPT

## 2024-09-13 RX ORDER — ALBUTEROL SULFATE 0.83 MG/ML
2.5 SOLUTION RESPIRATORY (INHALATION) ONCE
Status: DISCONTINUED | OUTPATIENT
Start: 2024-09-13 | End: 2024-09-14 | Stop reason: HOSPADM

## 2024-09-13 RX ADMIN — Medication 12 MG: at 14:23

## 2024-09-13 RX ADMIN — Medication 0.19 MG: at 14:09

## 2024-09-13 RX ADMIN — ALBUTEROL SULFATE 2.5 MG: 2.5 SOLUTION RESPIRATORY (INHALATION) at 14:28

## 2024-09-13 RX ADMIN — Medication 3 MG: at 14:18

## 2024-09-13 RX ADMIN — Medication 0.75 MG: at 14:14

## 2024-09-13 RX ADMIN — Medication 3 ML: at 13:54

## 2024-10-02 ENCOUNTER — OFFICE VISIT (OUTPATIENT)
Dept: FAMILY MEDICINE CLINIC | Facility: CLINIC | Age: 24
End: 2024-10-02
Payer: COMMERCIAL

## 2024-10-02 VITALS
SYSTOLIC BLOOD PRESSURE: 112 MMHG | OXYGEN SATURATION: 93 % | HEIGHT: 65 IN | WEIGHT: 267 LBS | BODY MASS INDEX: 44.48 KG/M2 | DIASTOLIC BLOOD PRESSURE: 96 MMHG | HEART RATE: 100 BPM

## 2024-10-02 DIAGNOSIS — Z12.4 CERVICAL CANCER SCREENING: ICD-10-CM

## 2024-10-02 DIAGNOSIS — Z01.419 WELL WOMAN EXAM WITH ROUTINE GYNECOLOGICAL EXAM: Primary | ICD-10-CM

## 2024-10-02 PROCEDURE — G0123 SCREEN CERV/VAG THIN LAYER: HCPCS | Performed by: NURSE PRACTITIONER

## 2024-10-02 PROCEDURE — 99395 PREV VISIT EST AGE 18-39: CPT | Performed by: NURSE PRACTITIONER

## 2024-10-02 PROCEDURE — 87624 HPV HI-RISK TYP POOLED RSLT: CPT | Performed by: NURSE PRACTITIONER

## 2024-10-02 NOTE — PROGRESS NOTES
"Subjective   History of Present Illness    Sylvia Oliva is a 24 y.o. female who presents for annual exam.    Obstetric History:  OB History          0    Para   0    Term   0       0    AB   0    Living   0         SAB   0    IAB   0    Ectopic   0    Molar   0    Multiple   0    Live Births   0               Menstrual History:     Patient's last menstrual period was 2024 (approximate).       Sexual History:         No results found for: \"HPVAPTIMA\"    Current contraception: abstinence  History of abnormal Pap smear: no  DIMITRY exposure in utero: no  Received Gardasil immunization: yes -   Perform regular self breast exam: yes - every now and then, not regularly  Family history of uterine or ovarian cancer: yes - great aunt  Family History of cervical cancer:no  Family History of colon cancer/colon polyps: yes - aunt  Regular self breast exam: yes  History of abnormal mammogram: no  Family history of breast cancer: yes - great aunt  History of abnormal lipids: no    The following portions of the patient's history were reviewed and updated as appropriate: allergies, current medications, past family history, past medical history, past social history, past surgical history, and problem list.    Review of Systems    A comprehensive review of systems was negative.     Objective   Physical Exam    /96 (BP Location: Right arm, Patient Position: Sitting, Cuff Size: Large Adult)   Pulse 100   Ht 165.1 cm (65\")   Wt 121 kg (267 lb)   LMP 2024 (Approximate)   SpO2 93%   BMI 44.43 kg/m²   Wt Readings from Last 3 Encounters:   10/02/24 121 kg (267 lb)   24 115 kg (253 lb)   24 115 kg (253 lb 9.6 oz)      BP Readings from Last 3 Encounters:   10/02/24 112/96   24 126/84   24 129/73        General:   alert, appears stated age, and cooperative   Heart: regular rate and rhythm, S1, S2 normal, no murmur, click, rub or gallop   Lungs: clear to auscultation bilaterally "   Abdomen: soft, non-tender, without masses or organomegaly   Breast: inspection negative, no nipple discharge or bleeding, no masses or nodularity palpable   Vulva: normal   Vagina: normal mucosa, normal discharge   Cervix: no bleeding following Pap, no cervical motion tenderness, and no lesions   Uterus: normal size   Adnexa: normal adnexa and no mass, fullness, tenderness     Assessment & Plan   Diagnoses and all orders for this visit:    1. Well woman exam with routine gynecological exam (Primary)  -     IGP, Aptima HPV, Rfx 16 / 18,45; Future  -     IGP, Aptima HPV, Rfx 16 / 18,45    2. Cervical cancer screening  -     IGP, Aptima HPV, Rfx 16 / 18,45; Future  -     IGP, Aptima HPV, Rfx 16 / 18,45        All questions answered.  Await pap smear results.  Breast self exam technique reviewed and patient encouraged to perform self-exam monthly.  Diagnosis explained in detail, including differential.  Discussed healthy lifestyle modifications.  Follow up in 6 months.  KOH prep.  Pap smear.      The patient is advised to continue current healthy lifestyle patterns and return for routine annual checkups.         Answers submitted by the patient for this visit:  Other (Submitted on 9/25/2024)  Please describe your symptoms.: N/A, Followup visit  Have you had these symptoms before?: No  How long have you been having these symptoms?: Greater than 2 weeks  Primary Reason for Visit (Submitted on 9/25/2024)  What is the primary reason for your visit?: Problem Not Listed

## 2024-10-07 LAB
CYTOLOGIST CVX/VAG CYTO: NORMAL
CYTOLOGY CVX/VAG DOC CYTO: NORMAL
CYTOLOGY CVX/VAG DOC THIN PREP: NORMAL
DX ICD CODE: NORMAL
HPV GENOTYPE REFLEX: NORMAL
HPV I/H RISK 4 DNA CVX QL PROBE+SIG AMP: NEGATIVE
Lab: NORMAL
OTHER STN SPEC: NORMAL
STAT OF ADQ CVX/VAG CYTO-IMP: NORMAL

## 2024-10-08 DIAGNOSIS — J45.909 UNCOMPLICATED ASTHMA, UNSPECIFIED ASTHMA SEVERITY, UNSPECIFIED WHETHER PERSISTENT: ICD-10-CM

## 2024-10-08 DIAGNOSIS — J30.2 SEASONAL ALLERGIC RHINITIS, UNSPECIFIED TRIGGER: ICD-10-CM

## 2024-10-08 RX ORDER — ALBUTEROL SULFATE 90 UG/1
2 INHALANT RESPIRATORY (INHALATION) EVERY 6 HOURS PRN
Qty: 18 G | Refills: 11 | Status: SHIPPED | OUTPATIENT
Start: 2024-10-08

## 2024-12-08 DIAGNOSIS — E55.9 VITAMIN D DEFICIENCY: ICD-10-CM

## 2024-12-09 RX ORDER — ERGOCALCIFEROL 1.25 MG/1
50000 CAPSULE, LIQUID FILLED ORAL WEEKLY
Qty: 12 CAPSULE | Refills: 1 | Status: SHIPPED | OUTPATIENT
Start: 2024-12-09

## 2025-01-04 DIAGNOSIS — F32.0 CURRENT MILD EPISODE OF MAJOR DEPRESSIVE DISORDER, UNSPECIFIED WHETHER RECURRENT: ICD-10-CM

## 2025-01-04 DIAGNOSIS — F41.9 ANXIETY: ICD-10-CM

## 2025-01-04 DIAGNOSIS — J45.909 UNCOMPLICATED ASTHMA, UNSPECIFIED ASTHMA SEVERITY, UNSPECIFIED WHETHER PERSISTENT: ICD-10-CM

## 2025-01-04 DIAGNOSIS — J30.2 SEASONAL ALLERGIC RHINITIS, UNSPECIFIED TRIGGER: ICD-10-CM

## 2025-01-06 RX ORDER — DESVENLAFAXINE 25 MG/1
25 TABLET, EXTENDED RELEASE ORAL DAILY
Qty: 90 TABLET | Refills: 1 | Status: SHIPPED | OUTPATIENT
Start: 2025-01-06

## 2025-01-07 RX ORDER — MONTELUKAST SODIUM 10 MG/1
10 TABLET ORAL NIGHTLY
Qty: 90 TABLET | Refills: 1 | Status: SHIPPED | OUTPATIENT
Start: 2025-01-07

## 2025-04-07 ENCOUNTER — APPOINTMENT (OUTPATIENT)
Dept: GENERAL RADIOLOGY | Facility: HOSPITAL | Age: 25
End: 2025-04-07
Payer: COMMERCIAL

## 2025-04-07 ENCOUNTER — HOSPITAL ENCOUNTER (EMERGENCY)
Facility: HOSPITAL | Age: 25
Discharge: HOME | End: 2025-04-07
Attending: EMERGENCY MEDICINE | Admitting: EMERGENCY MEDICINE
Payer: COMMERCIAL

## 2025-04-07 VITALS
SYSTOLIC BLOOD PRESSURE: 129 MMHG | DIASTOLIC BLOOD PRESSURE: 64 MMHG | RESPIRATION RATE: 18 BRPM | BODY MASS INDEX: 42.83 KG/M2 | HEIGHT: 65 IN | TEMPERATURE: 97.5 F | OXYGEN SATURATION: 99 % | HEART RATE: 78 BPM | WEIGHT: 257.06 LBS

## 2025-04-07 DIAGNOSIS — J45.901 ASTHMA WITH ACUTE EXACERBATION, UNSPECIFIED ASTHMA SEVERITY, UNSPECIFIED WHETHER PERSISTENT: Primary | ICD-10-CM

## 2025-04-07 PROCEDURE — 94761 N-INVAS EAR/PLS OXIMETRY MLT: CPT

## 2025-04-07 PROCEDURE — 94640 AIRWAY INHALATION TREATMENT: CPT

## 2025-04-07 PROCEDURE — 99283 EMERGENCY DEPT VISIT LOW MDM: CPT

## 2025-04-07 PROCEDURE — 96374 THER/PROPH/DIAG INJ IV PUSH: CPT

## 2025-04-07 PROCEDURE — 71045 X-RAY EXAM CHEST 1 VIEW: CPT

## 2025-04-07 PROCEDURE — 25010000002 METHYLPREDNISOLONE PER 125 MG: Performed by: EMERGENCY MEDICINE

## 2025-04-07 PROCEDURE — 94799 UNLISTED PULMONARY SVC/PX: CPT

## 2025-04-07 RX ORDER — IPRATROPIUM BROMIDE AND ALBUTEROL SULFATE 2.5; .5 MG/3ML; MG/3ML
3 SOLUTION RESPIRATORY (INHALATION) ONCE
Status: COMPLETED | OUTPATIENT
Start: 2025-04-07 | End: 2025-04-07

## 2025-04-07 RX ORDER — PREDNISONE 20 MG/1
60 TABLET ORAL DAILY
Qty: 15 TABLET | Refills: 0 | Status: SHIPPED | OUTPATIENT
Start: 2025-04-07

## 2025-04-07 RX ADMIN — IPRATROPIUM BROMIDE AND ALBUTEROL SULFATE 3 ML: .5; 3 SOLUTION RESPIRATORY (INHALATION) at 07:47

## 2025-04-07 RX ADMIN — METHYLPREDNISOLONE SODIUM SUCCINATE 125 MG: 125 INJECTION INTRAMUSCULAR; INTRAVENOUS at 07:57

## 2025-04-07 NOTE — Clinical Note
[facility] EMERGENCY ROOM  [address] [place] [address]  Phone: [phone]  Fax: [phone]    Sylvia Oliva was seen and treated in our emergency department on 4/7/2025.  She may return to work on 04/08/2025.         Thank you for choosing [facility].    Ilan Mathur MD

## 2025-04-07 NOTE — ED PROVIDER NOTES
Time: 7:27 AM EDT  Date of encounter:  4/7/2025  Independent Historian/Clinical History and Information was obtained by:   Patient and Family    History is limited by: N/A    Chief Complaint: Shortness of air      History of Present Illness:  Patient is a 24 y.o. year old female who presents to the emergency department for evaluation of shortness of air and wheezing.  Patient has a history of asthma and reports she has had multiple asthma attacks in the last couple of days.  She does report using her home medications including her albuterol nebulizer with some relief but states that she has not lasting.  She denies fever or recent illness.      Patient Care Team  Primary Care Provider: Christal Lim APRN    Past Medical History:     No Known Allergies  Past Medical History:   Diagnosis Date    ADHD (attention deficit hyperactivity disorder)     Allergic rhinitis     Anxiety     Asthma     Asthma, extrinsic     Asthma, intrinsic     Depression     Kidney stone     Plantar wart     Pneumonia     Seasonal allergies      Past Surgical History:   Procedure Laterality Date    CYSTOSCOPY URETEROSCOPY Right 10/04/2021    Procedure: CYSTOSCOPY RIGHT URETEROSCOPY, RETROGRADE PYELOGRAM, LASERTRIPSY, STONE BASKET EXTRACTION AND STENT INSERTION;  Surgeon: Amanda Orona MD;  Location: [facility] OR;  Service: Urology;  Laterality: Right;    ENDOSCOPY      MOUTH SURGERY       Family History   Problem Relation Age of Onset    Hypertension Mother     Asthma Mother     Hypertension Maternal Grandfather     Kidney cancer Maternal Grandfather     Cancer Maternal Grandfather         kidney    Cancer Maternal Grandmother         esophageal    Cancer Maternal Aunt         colon       Home Medications:  Prior to Admission medications    Medication Sig Start Date End Date Taking? Authorizing Provider   albuterol sulfate  (90 Base) MCG/ACT inhaler Inhale 2 puffs Every 6 (Six) Hours As Needed for Wheezing. 10/8/24   Mandy  Myles BRANCH MD   atomoxetine (STRATTERA) 60 MG capsule Take 1 capsule by mouth Daily. 6/3/24   Christal Lim APRN   azelastine (ASTELIN) 0.1 % nasal spray 2 sprays into the nostril(s) as directed by provider 2 (Two) Times a Day. Use in each nostril as directed 12/7/23   Myles Galvan MD   busPIRone (BUSPAR) 7.5 MG tablet TAKE 1 TABLET BY MOUTH 3 TIMES A DAY 5/17/24   Christal Lim APRN   cetirizine (zyrTEC) 10 MG tablet TAKE 1 TABLET BY MOUTH DAILY 5/20/24   Marry Haynes APRN   Desvenlafaxine Succinate ER 25 MG tablet sustained-release 24 hour TAKE 1 TABLET BY MOUTH DAILY 1/6/25   Christal Lim APRN   hydrOXYzine (ATARAX) 25 MG tablet Take 1 tablet by mouth 3 (Three) Times a Day As Needed for Itching. 8/14/23   Christal Lim APRN   meloxicam (MOBIC) 7.5 MG tablet TAKE 1 TABLET BY MOUTH DAILY 6/25/24   Christal Lim APRN   montelukast (SINGULAIR) 10 MG tablet TAKE ONE TABLET BY MOUTH ONCE NIGHTLY 1/7/25   Myles Galvan MD   Triamcinolone Acetonide (NASACORT) 55 MCG/ACT nasal inhaler 2 sprays into the nostril(s) as directed by provider Daily.    Provider, MD Kayden   vitamin D (ERGOCALCIFEROL) 1.25 MG (99705 UT) capsule capsule TAKE 1 CAPSULE BY MOUTH ONCE WEEKLY 12/9/24   Christal Lim APRN        Social History:   Social History     Tobacco Use    Smoking status: Never    Smokeless tobacco: Never   Vaping Use    Vaping status: Never Used   Substance Use Topics    Alcohol use: Yes     Alcohol/week: 2.0 standard drinks of alcohol     Types: 1 Glasses of wine, 1 Shots of liquor per week     Comment: I drink very infrequently but I guess it averages out    Drug use: Never         Review of Systems:  Review of Systems   Constitutional:  Negative for chills and fever.   HENT:  Negative for congestion, rhinorrhea and sore throat.    Eyes:  Negative for pain and visual disturbance.   Respiratory:  Positive for shortness of breath and wheezing. Negative for apnea, cough and chest tightness.   "  Cardiovascular:  Negative for chest pain and palpitations.   Gastrointestinal:  Negative for abdominal pain, diarrhea, nausea and vomiting.   Genitourinary:  Negative for difficulty urinating and dysuria.   Musculoskeletal:  Negative for joint swelling and myalgias.   Skin:  Negative for color change.   Neurological:  Negative for seizures and headaches.   Psychiatric/Behavioral: Negative.     All other systems reviewed and are negative.       Physical Exam:  /64 (BP Location: Left arm, Patient Position: Sitting)   Pulse 78   Temp 97.5 °F (36.4 °C) (Oral)   Resp 16   Ht 165.1 cm (65\")   Wt 117 kg (257 lb 0.9 oz)   LMP 04/03/2025 (Approximate)   SpO2 97%   BMI 42.78 kg/m²     Physical Exam  Vitals and nursing note reviewed.   Constitutional:       General: She is not in acute distress.     Appearance: Normal appearance. She is not toxic-appearing.   HENT:      Head: Normocephalic and atraumatic.      Jaw: There is normal jaw occlusion.   Eyes:      General: Lids are normal.      Extraocular Movements: Extraocular movements intact.      Conjunctiva/sclera: Conjunctivae normal.      Pupils: Pupils are equal, round, and reactive to light.   Cardiovascular:      Rate and Rhythm: Normal rate and regular rhythm.      Pulses: Normal pulses.      Heart sounds: Normal heart sounds.   Pulmonary:      Effort: Pulmonary effort is normal. No respiratory distress.      Breath sounds: Wheezing present. No rhonchi.   Abdominal:      General: Abdomen is flat.      Palpations: Abdomen is soft.      Tenderness: There is no abdominal tenderness. There is no guarding or rebound.   Musculoskeletal:         General: Normal range of motion.      Cervical back: Normal range of motion and neck supple.      Right lower leg: No edema.      Left lower leg: No edema.   Skin:     General: Skin is warm and dry.   Neurological:      Mental Status: She is alert and oriented to person, place, and time. Mental status is at baseline. "   Psychiatric:         Mood and Affect: Mood normal.                    Medical Decision Making:      Comorbidities that affect care:    Asthma    External Notes reviewed:    Previous Clinic Note: Pulmonology office visit for asthma management      The following orders were placed and all results were independently analyzed by me:  Orders Placed This Encounter   Procedures    XR Chest 1 View       Medications Given in the Emergency Department:  Medications   ipratropium-albuterol (DUO-NEB) nebulizer solution 3 mL (3 mL Nebulization Given 4/7/25 3266)   methylPREDNISolone sodium succinate (SOLU-Medrol) 125 mg in sterile water (preservative free) 2 mL (125 mg Intravenous Given 4/7/25 1825)        ED Course:         Labs:    Lab Results (last 24 hours)       ** No results found for the last 24 hours. **             Imaging:    XR Chest 1 View  Result Date: 4/7/2025  XR CHEST 1 VW Date of Exam: 4/7/2025 7:33 AM EDT Indication: Wheezing, short of air Comparison: May 15, 2023 Findings: The lungs are clear. The heart and mediastinal contours appear normal. There is no pleural effusion. The pulmonary vasculature appears normal. The osseous structures appear intact.     Impression: No acute cardiopulmonary process. Electronically Signed: Carlton Chen MD  4/7/2025 7:45 AM EDT  Workstation ID: [id number]        Differential Diagnosis and Discussion:    Dyspnea: Differential diagnosis includes but is not limited to metabolic acidosis, neurological disorders, psychogenic, asthma, pneumothorax, upper airway obstruction, COPD, pneumonia, noncardiogenic pulmonary edema, interstitial lung disease, anemia, congestive heart failure, and pulmonary embolism    PROCEDURES:    X-ray were performed in the emergency department and all X-ray impressions were independently interpreted by me.    No orders to display       Procedures    MDM  Number of Diagnoses or Management Options  Asthma with acute exacerbation, unspecified asthma  severity, unspecified whether persistent  Diagnosis management comments: In summary this is a 24-year-old female with a history of asthma who presents for evaluation and treatment of multiple asthma attacks in the last several days.  On arrival she is in no respiratory distress but is wheezing.  Chest x-ray reviewed by me is unremarkable and negative for acute pathology.  Patient was given DuoNeb and Solu-Medrol in the emergency department with significant improvement of her symptoms.  Very strict return to ER and follow-up instructions have been provided to the patient.  Prescription steroids provided.                       Patient Care Considerations:    CONSULT: I considered consulting pulmonology, however symptoms improved after treatment      Consultants/Shared Management Plan:    None    Social Determinants of Health:    Patient is independent, reliable, and has access to care.       Disposition and Care Coordination:    Discharged: The patient is suitable and stable for discharge with no need for consideration of admission.    I have explained the patient´s condition, diagnoses and treatment plan based on the information available to me at this time. I have answered questions and addressed any concerns. The patient has a good  understanding of the patient´s diagnosis, condition, and treatment plan as can be expected at this point. The vital signs have been stable. The patient´s condition is stable and appropriate for discharge from the emergency department.      The patient will pursue further outpatient evaluation with the primary care physician or other designated or consulting physician as outlined in the discharge instructions. They are agreeable to this plan of care and follow-up instructions have been explained in detail. The patient has received these instructions in written format and has expressed an understanding of the discharge instructions. The patient is aware that any significant change in  condition or worsening of symptoms should prompt an immediate return to this or the closest emergency department or call to 911.  I have explained discharge medications and the need for follow up with the patient/caretakers. This was also printed in the discharge instructions. Patient was discharged with the following medications and follow up:      Medication List        New Prescriptions      predniSONE 20 MG tablet  Commonly known as: DELTASONE  Take 3 tablets by mouth Daily.               Where to Get Your Medications        These medications were sent to [facility] [id number] - [place], [place] - [address] AT [facility] [address] - [phone]  - [phone] FX  [address], [place] [address]      Phone: [phone]   predniSONE 20 MG tablet      Christal Lim, APRN  [address]  [address]  [place] [address]  [phone]    In 1 week         Final diagnoses:   Asthma with acute exacerbation, unspecified asthma severity, unspecified whether persistent        ED Disposition       ED Disposition   Discharge    Condition   Stable    Comment   --               This medical record created using voice recognition software.             Ilan Mathur MD  04/07/25 [id number]

## 2025-04-28 ENCOUNTER — OFFICE VISIT (OUTPATIENT)
Dept: FAMILY MEDICINE CLINIC | Facility: CLINIC | Age: 25
End: 2025-04-28
Payer: COMMERCIAL

## 2025-04-28 VITALS
TEMPERATURE: 97.9 F | WEIGHT: 261 LBS | HEART RATE: 110 BPM | SYSTOLIC BLOOD PRESSURE: 129 MMHG | OXYGEN SATURATION: 96 % | HEIGHT: 65 IN | DIASTOLIC BLOOD PRESSURE: 66 MMHG | BODY MASS INDEX: 43.49 KG/M2

## 2025-04-28 DIAGNOSIS — Z30.09 STERILIZATION CONSULT: ICD-10-CM

## 2025-04-28 DIAGNOSIS — R53.83 OTHER FATIGUE: ICD-10-CM

## 2025-04-28 DIAGNOSIS — J45.909 UNCOMPLICATED ASTHMA, UNSPECIFIED ASTHMA SEVERITY, UNSPECIFIED WHETHER PERSISTENT: ICD-10-CM

## 2025-04-28 DIAGNOSIS — J30.2 SEASONAL ALLERGIC RHINITIS, UNSPECIFIED TRIGGER: ICD-10-CM

## 2025-04-28 DIAGNOSIS — Z13.6 ENCOUNTER FOR LIPID SCREENING FOR CARDIOVASCULAR DISEASE: ICD-10-CM

## 2025-04-28 DIAGNOSIS — F34.1 PERSISTENT DEPRESSIVE DISORDER: ICD-10-CM

## 2025-04-28 DIAGNOSIS — Z13.29 THYROID DISORDER SCREEN: ICD-10-CM

## 2025-04-28 DIAGNOSIS — Z13.220 ENCOUNTER FOR LIPID SCREENING FOR CARDIOVASCULAR DISEASE: ICD-10-CM

## 2025-04-28 DIAGNOSIS — Z00.00 ANNUAL PHYSICAL EXAM: Primary | ICD-10-CM

## 2025-04-28 DIAGNOSIS — E55.9 VITAMIN D DEFICIENCY: ICD-10-CM

## 2025-04-28 NOTE — PROGRESS NOTES
Primary Care Provider  Christal Lim APRN   Referring Provider  No ref. provider found    Patient Complaint  Shortness of Breath, Asthma, and er follow up (4/7 for asthma )    Patient or patient representative verbalized consent for the use of Ambient Listening during the visit with  JEFF Swift for chart documentation. 4/29/2025  09:23 EDT      Subjective       History of Presenting Illness  Sylvia Oliva is a pleasant 25 y.o. female  of  Dr. Galvan who presents to [facility] with history of asthma here for follow-up here patient was last seen 6/13/2024.  Patient continues under the care of [facility] with weekly injections for allergy shots.  She continues on Singulair Zyrtec azelastine nasal spray and albuterol as needed.      History of Present Illness  The patient presents for evaluation of asthma.    She has been experiencing a series of asthma attacks, necessitating multiple emergency room visits. She was previously on a maintenance inhaler, which was discontinued due to its ineffectiveness. She had been on Breo, but it was also discontinued as it did not provide relief and caused significant discomfort. A methacholine challenge was conducted, yielding positive results for asthma. Over the past 6 months, she has had one severe flare-up that required hospitalization, during which she experienced between 7 to 10 episodes over a span of 3 days. She reports feeling slightly tight in her chest, which she attributes to her usual morning symptoms. She is not experiencing any cardiac pain, fever, or chills. She has not contracted COVID-19, influenza, RSV, or pneumonia since her last visit. She is not currently on oxygen therapy. She continues to receive allergy injections. Her current medication regimen includes Singulair, Zyrtec, a nasal spray, and albuterol as needed.    MEDICATIONS  Current: Singulair, Zyrtec,  albuterol  Discontinued: Breo       At present time patient denies dyspnea, coughing, wheezing, headaches, chest pain, weight loss or hemoptysis. Patient denies fevers, chills and night sweats. Sylvia Oliva is able to perform ADLs.      I have personally reviewed the review of systems, past family, social, medical and surgical histories; and agree with their findings.      Review of Systems   Constitutional: Negative.    HENT: Negative.     Respiratory: Negative.     Cardiovascular: Negative.    Musculoskeletal: Negative.    Neurological: Negative.    Psychiatric/Behavioral: Negative.           Family History   Problem Relation Age of Onset    Hypertension Mother     Asthma Mother     Anxiety disorder Mother     Arthritis Mother     Depression Mother     Hyperlipidemia Mother     Hypertension Maternal Grandfather     Kidney cancer Maternal Grandfather     Cancer Maternal Grandfather         kidney    Alcohol abuse Maternal Grandfather     Arthritis Maternal Grandfather     Hyperlipidemia Maternal Grandfather     Cancer Maternal Grandmother         esophageal    Alcohol abuse Maternal Grandmother     Arthritis Maternal Grandmother     Cancer Maternal Aunt         colon        Social History     Socioeconomic History    Marital status: Single   Tobacco Use    Smoking status: Never    Smokeless tobacco: Never   Vaping Use    Vaping status: Never Used   Substance and Sexual Activity    Alcohol use: Yes     Alcohol/week: 2.0 standard drinks of alcohol     Types: 1 Glasses of wine, 1 Shots of liquor per week     Comment: I drink very infrequently but I guess it averages out    Drug use: Never    Sexual activity: Never        Past Medical History:   Diagnosis Date    ADHD (attention deficit hyperactivity disorder)     Allergic     Allergic rhinitis     Anxiety     Asthma     Asthma, extrinsic     Asthma, intrinsic     Depression     Headache     Kidney stone     Low back pain     Plantar wart     Pneumonia     Seasonal  allergies         Immunization History   Administered Date(s) Administered    COVID-19 (PFIZER) 12YRS+ (COMIRNATY) 10/24/2023, 09/07/2024    COVID-19 (PFIZER) BIVALENT 12+YRS 10/14/2022    COVID-19 (PFIZER) Purple Cap Monovalent 04/10/2021, 05/01/2021, 11/02/2021    Flublok 18+yrs 09/30/2019, 10/22/2021, 10/14/2022, 10/24/2023    Fluzone Quad >6mos (Multi-dose) 10/10/2020    Hpv9 06/03/2024       No Known Allergies       Current Outpatient Medications:     albuterol sulfate  (90 Base) MCG/ACT inhaler, Inhale 2 puffs Every 6 (Six) Hours As Needed for Wheezing., Disp: 18 g, Rfl: 11    atomoxetine (STRATTERA) 60 MG capsule, Take 1 capsule by mouth Daily., Disp: 30 capsule, Rfl: 5    azelastine (ASTELIN) 0.1 % nasal spray, 2 sprays into the nostril(s) as directed by provider 2 (Two) Times a Day. Use in each nostril as directed, Disp: 1 each, Rfl: 11    busPIRone (BUSPAR) 7.5 MG tablet, TAKE 1 TABLET BY MOUTH 3 TIMES A DAY, Disp: 180 tablet, Rfl: 0    cetirizine (zyrTEC) 10 MG tablet, TAKE 1 TABLET BY MOUTH DAILY, Disp: 30 tablet, Rfl: 3    Desvenlafaxine Succinate ER 25 MG tablet sustained-release 24 hour, TAKE 1 TABLET BY MOUTH DAILY, Disp: 90 tablet, Rfl: 1    hydrOXYzine (ATARAX) 25 MG tablet, Take 1 tablet by mouth 3 (Three) Times a Day As Needed for Itching., Disp: 90 tablet, Rfl: 1    meloxicam (MOBIC) 7.5 MG tablet, TAKE 1 TABLET BY MOUTH DAILY, Disp: 90 tablet, Rfl: 0    montelukast (SINGULAIR) 10 MG tablet, TAKE ONE TABLET BY MOUTH ONCE NIGHTLY, Disp: 90 tablet, Rfl: 1    predniSONE (DELTASONE) 20 MG tablet, Take 3 tablets by mouth Daily., Disp: 15 tablet, Rfl: 0    Triamcinolone Acetonide (NASACORT) 55 MCG/ACT nasal inhaler, Administer 2 sprays into the nostril(s) as directed by provider Daily., Disp: , Rfl:     vitamin D (ERGOCALCIFEROL) 1.25 MG (58414 UT) capsule capsule, TAKE 1 CAPSULE BY MOUTH ONCE WEEKLY, Disp: 12 capsule, Rfl: 1    fluticasone-salmeterol (Advair HFA) 230-21 MCG/ACT inhaler, Inhale  "2 puffs 2 (Two) Times a Day., Disp: 12 g, Rfl: 11         Vital Signs   /92 (BP Location: Right arm, Patient Position: Sitting, Cuff Size: Adult)   Pulse 92   Temp 97.8 °F (36.6 °C)   Resp 16   Ht 165.1 cm (65\")   Wt 118 kg (260 lb)   SpO2 97%   BMI 43.27 kg/m²       Objective     Physical Exam  Vitals reviewed.   Constitutional:       Appearance: Normal appearance.   HENT:      Head: Normocephalic and atraumatic.      Nose: Nose normal.      Mouth/Throat:      Mouth: Mucous membranes are moist.      Pharynx: Oropharynx is clear.   Eyes:      Extraocular Movements: Extraocular movements intact.      Conjunctiva/sclera: Conjunctivae normal.      Pupils: Pupils are equal, round, and reactive to light.   Cardiovascular:      Rate and Rhythm: Normal rate and regular rhythm.      Pulses: Normal pulses.      Heart sounds: Normal heart sounds.   Pulmonary:      Effort: Pulmonary effort is normal.      Breath sounds: Normal breath sounds.   Abdominal:      General: Bowel sounds are normal.   Musculoskeletal:         General: Normal range of motion.      Cervical back: Normal range of motion and neck supple.   Skin:     General: Skin is warm and dry.   Neurological:      Mental Status: She is alert and oriented to person, place, and time.   Psychiatric:         Behavior: Behavior normal.         Physical Exam  Lungs are clear. No wheezing.  Heart was examined.    Vital Signs  Vital signs are stable.         Results Review  I have personally reviewed the prior office notes, hospital records, labs, and diagnostics.  XR Chest 1 View [[id number]] (Order [id number])  Order  Status: Final result     Study Notes     Debra Peng on 4/7/2025  7:38 AM EDT   Wheezing, short of air/  Asthma     Appointment Information    PACS Images     Radiology Images  Study Result    Narrative & Impression   XR CHEST 1 VW     Date of Exam: 4/7/2025 7:33 AM EDT     Indication: Wheezing, short of air     Comparison: May 15, 2023   "   Findings:  The lungs are clear. The heart and mediastinal contours appear normal. There is no pleural effusion. The pulmonary vasculature appears normal. The osseous structures appear intact.     IMPRESSION:  Impression:  No acute cardiopulmonary process.        Electronically Signed: Carlton Chen MD    4/7/2025 7:45 AM EDT    Workstation ID: [id number]   Results  Bronchial Challenge With Methacholine (Order [id number])  Order-Level Documents:    Scan on 9/13/2024 1517 by Marry Haynes APRN: BRONCHIAL CHALLENGE WITH METHACHOLINE, [facility], 09/13/2024         Author: -- Service: -- Author Type: --   Filed: Date of Service: Creation Time:   Status: (Other)   Pulmonary Function Test Interpretation  Sylvia Oliva  [id number]     09/16/24  14:49 EDT     Spirometry  Bronchial challenge test is positive with methacholine at a dose of 200           Electronically signed by Eduard Smith MD, 09/16/24, 2:49 PM EDT.        Results  Imaging  Chest x-ray did not show anything acute.    Testing  Methacholine challenge was positive for asthma.          Assessment         Patient Active Problem List   Diagnosis    Abnormal bone density screening    Asthma    Depression    Plantar wart    Seasonal allergic rhinitis    Vitamin D deficiency        Plan     Diagnoses and all orders for this visit:    1. Uncomplicated asthma, unspecified asthma severity, unspecified whether persistent (Primary)  -     fluticasone-salmeterol (Advair HFA) 230-21 MCG/ACT inhaler; Inhale 2 puffs 2 (Two) Times a Day.  Dispense: 12 g; Refill: 11    2. Seasonal allergic rhinitis, unspecified trigger  -     fluticasone-salmeterol (Advair HFA) 230-21 MCG/ACT inhaler; Inhale 2 puffs 2 (Two) Times a Day.  Dispense: 12 g; Refill: 11         Assessment & Plan  1. Asthma.  She has experienced multiple asthma attacks recently, including a significant flare-up requiring emergency room treatment. She was previously on Breo but found it ineffective and unpleasant. A  methacholine challenge confirmed the diagnosis of asthma. She is currently using Singulair, Zyrtec, a nasal spray, and albuterol as needed. Advair HFA will be prescribed as a maintenance inhaler. She is advised to rinse her mouth and brush her teeth and tongue after using the inhaler to prevent thrush. A CBC will be ordered today to monitor her condition. She will continue with her current medications, including albuterol, which has sufficient refills until November.    Follow-up  The patient will follow up in 5 to 6 weeks to evaluate the response to Advair HFA.      Smoking status:  reports that she has never smoked. She has never used smokeless tobacco.    Vaccination status: Reviewed  Immunization History   Administered Date(s) Administered    COVID-19 (PFIZER) 12YRS+ (COMIRNATY) 10/24/2023, 09/07/2024    COVID-19 (PFIZER) BIVALENT 12+YRS 10/14/2022    COVID-19 (PFIZER) Purple Cap Monovalent 04/10/2021, 05/01/2021, 11/02/2021    Flublok 18+yrs 09/30/2019, 10/22/2021, 10/14/2022, 10/24/2023    Fluzone Quad >6mos (Multi-dose) 10/10/2020    Hpv9 06/03/2024        Medications personally reviewed    Follow Up  Return in about 5 weeks (around 6/3/2025) for to eveal resposne to Advair HFA.    Patient was given instructions and counseling regarding her condition or for health maintenance advice. Please see specific information pulled into the AVS if appropriate.     I spent 15 minutes caring for Sylvia Oliva on this date of service. This time includes time spent by me in the following activities:preparing for the visit, reviewing tests, obtaining and/or reviewing a separately obtained history, performing a medically appropriate examination and/or evaluation, counseling and educating the patient/family/caregiver, ordering medications, tests, or procedures, documenting information in the medical record, independently interpreting results and communicating that information with the patient/family/caregiver and answered  questions family members, discuss medications.

## 2025-04-28 NOTE — PROGRESS NOTES
Chief Complaint  Fatigue  Annual physical exam  SUBJECTIVE  Sylvia Oliva presents to [facility]    Annual physical exam    Patient complaining of continuing fatigue.  Patient states she is taking Vitamin D and getting as much sun as possible.  Patient states it is affecting her driving and work performance.  Patient states she sleeps solidly and wakes up feeling well rested. Pt is unsure if she sleeps well or not. Reports she does wake up at least once nightly.     Patient requesting referral to GYN for tubal ligation.  She does not desire having children in the future.    History of Present Illness  Past Medical History:   Diagnosis Date    ADHD (attention deficit hyperactivity disorder)     Allergic rhinitis     Anxiety     Asthma     Asthma, extrinsic     Asthma, intrinsic     Depression     Kidney stone     Plantar wart     Pneumonia     Seasonal allergies       Family History   Problem Relation Age of Onset    Hypertension Mother     Asthma Mother     Hypertension Maternal Grandfather     Kidney cancer Maternal Grandfather     Cancer Maternal Grandfather         kidney    Cancer Maternal Grandmother         esophageal    Cancer Maternal Aunt         colon      Past Surgical History:   Procedure Laterality Date    CYSTOSCOPY URETEROSCOPY Right 10/04/2021    Procedure: CYSTOSCOPY RIGHT URETEROSCOPY, RETROGRADE PYELOGRAM, LASERTRIPSY, STONE BASKET EXTRACTION AND STENT INSERTION;  Surgeon: Amanda Orona MD;  [facility]: [facility];  Service: Urology;  Laterality: Right;    ENDOSCOPY      MOUTH SURGERY          Current Outpatient Medications:     albuterol sulfate  (90 Base) MCG/ACT inhaler, Inhale 2 puffs Every 6 (Six) Hours As Needed for Wheezing., Disp: 18 g, Rfl: 11    atomoxetine (STRATTERA) 60 MG capsule, Take 1 capsule by mouth Daily., Disp: 30 capsule, Rfl: 5    azelastine (ASTELIN) 0.1 % nasal spray, 2 sprays into the nostril(s) as directed by provider 2  "(Two) Times a Day. Use in each nostril as directed, Disp: 1 each, Rfl: 11    busPIRone (BUSPAR) 7.5 MG tablet, TAKE 1 TABLET BY MOUTH 3 TIMES A DAY, Disp: 180 tablet, Rfl: 0    cetirizine (zyrTEC) 10 MG tablet, TAKE 1 TABLET BY MOUTH DAILY, Disp: 30 tablet, Rfl: 3    Desvenlafaxine Succinate ER 25 MG tablet sustained-release 24 hour, TAKE 1 TABLET BY MOUTH DAILY, Disp: 90 tablet, Rfl: 1    hydrOXYzine (ATARAX) 25 MG tablet, Take 1 tablet by mouth 3 (Three) Times a Day As Needed for Itching., Disp: 90 tablet, Rfl: 1    meloxicam (MOBIC) 7.5 MG tablet, TAKE 1 TABLET BY MOUTH DAILY, Disp: 90 tablet, Rfl: 0    montelukast (SINGULAIR) 10 MG tablet, TAKE ONE TABLET BY MOUTH ONCE NIGHTLY, Disp: 90 tablet, Rfl: 1    predniSONE (DELTASONE) 20 MG tablet, Take 3 tablets by mouth Daily., Disp: 15 tablet, Rfl: 0    Triamcinolone Acetonide (NASACORT) 55 MCG/ACT nasal inhaler, Administer 2 sprays into the nostril(s) as directed by provider Daily., Disp: , Rfl:     vitamin D (ERGOCALCIFEROL) 1.25 MG (44100 UT) capsule capsule, TAKE 1 CAPSULE BY MOUTH ONCE WEEKLY, Disp: 12 capsule, Rfl: 1    OBJECTIVE  Vital Signs:   /66 (BP Location: Left arm, Patient Position: Sitting, Cuff Size: Large Adult)   Pulse 110   Temp 97.9 °F (36.6 °C) (Temporal)   Ht 165.1 cm (65\")   Wt 118 kg (261 lb)   LMP 04/03/2025 (Approximate)   SpO2 96%   BMI 43.43 kg/m²    Estimated body mass index is 43.43 kg/m² as calculated from the following:    Height as of this encounter: 165.1 cm (65\").    Weight as of this encounter: 118 kg (261 lb).     Wt Readings from Last 3 Encounters:   04/28/25 118 kg (261 lb)   04/07/25 117 kg (257 lb 0.9 oz)   10/02/24 121 kg (267 lb)     BP Readings from Last 3 Encounters:   04/28/25 129/66   04/07/25 129/64   10/02/24 112/96       Physical Exam  Vitals reviewed.   Constitutional:       Appearance: Normal appearance. She is well-developed.   HENT:      Head: Normocephalic and atraumatic.      Right Ear: External ear " normal.      Left Ear: External ear normal.      Mouth/Throat:      Pharynx: No oropharyngeal exudate.   Eyes:      Conjunctiva/sclera: Conjunctivae normal.      Pupils: Pupils are equal, round, and reactive to light.   Neck:      Vascular: No carotid bruit.   Cardiovascular:      Rate and Rhythm: Normal rate and regular rhythm.      Pulses: Normal pulses.      Heart sounds: Normal heart sounds. No murmur heard.     No friction rub. No gallop.   Pulmonary:      Effort: Pulmonary effort is normal.      Breath sounds: Normal breath sounds. No wheezing or rhonchi.   Skin:     General: Skin is warm and dry.   Neurological:      Mental Status: She is alert and oriented to person, place, and time.      Cranial Nerves: No cranial nerve deficit.   Psychiatric:         Mood and Affect: Mood and affect normal.         Behavior: Behavior normal.         Thought Content: Thought content normal.         Judgment: Judgment normal.          Result Review        XR Chest 1 View  Result Date: 4/7/2025  Impression: No acute cardiopulmonary process. Electronically Signed: Carlton Chen MD  4/7/2025 7:45 AM EDT  Workstation ID: [id number]        The above data has been reviewed by JEFF Patel 04/28/2025 15:53 EDT.          Patient Care Team:  Christal Lim APRN as PCP - General (Family Medicine)  Amanda Orona MD as Consulting Physician (Urology)  Myles Galvan MD as Consulting Physician (Pulmonary Disease)    Class 3 Severe Obesity (BMI >=40). Obesity-related health conditions include the following: none. Obesity is unchanged. BMI is is above average; BMI management plan is completed. We discussed portion control and increasing exercise.       ASSESSMENT & PLAN    Diagnoses and all orders for this visit:    1. Annual physical exam (Primary)  -     Comprehensive Metabolic Panel; Future  -     CBC & Differential; Future  -     Lipid Panel; Future  -     TSH Rfx On Abnormal To Free T4; Future    2. Uncomplicated  asthma, unspecified asthma severity, unspecified whether persistent  Comments:  Continue follow-up with pulmonology provider    3. Vitamin D deficiency    4. Seasonal allergic rhinitis, unspecified trigger  Comments:  Symptoms well controlled with current medication regimen, cont. Current meds.    5. Persistent depressive disorder  Comments:  Doing well on current medication, continue meds    6. Other fatigue  -     Vitamin D,25-Hydroxy; Future  -     Vitamin B12; Future    7. Thyroid disorder screen  -     TSH Rfx On Abnormal To Free T4; Future    8. Encounter for lipid screening for cardiovascular disease  -     Lipid Panel; Future    9. Sterilization consult  -     Ambulatory Referral to Gynecology         Tobacco Use: Low Risk  (4/28/2025)    Patient History     Smoking Tobacco Use: Never     Smokeless Tobacco Use: Never     Passive Exposure: Not on file       Follow Up     Return in about 6 months (around 10/28/2025).      Patient was given instructions and counseling regarding her condition or for health maintenance advice. Please see specific information pulled into the AVS if appropriate.   I have reviewed information obtained and documented by others and I have confirmed the accuracy of this documented note.    JEFF Patel

## 2025-04-29 ENCOUNTER — OFFICE VISIT (OUTPATIENT)
Dept: PULMONOLOGY | Facility: CLINIC | Age: 25
End: 2025-04-29
Payer: COMMERCIAL

## 2025-04-29 ENCOUNTER — LAB (OUTPATIENT)
Facility: HOSPITAL | Age: 25
End: 2025-04-29
Payer: COMMERCIAL

## 2025-04-29 VITALS
RESPIRATION RATE: 16 BRPM | TEMPERATURE: 97.8 F | HEIGHT: 65 IN | HEART RATE: 92 BPM | BODY MASS INDEX: 43.32 KG/M2 | WEIGHT: 260 LBS | SYSTOLIC BLOOD PRESSURE: 150 MMHG | OXYGEN SATURATION: 97 % | DIASTOLIC BLOOD PRESSURE: 92 MMHG

## 2025-04-29 DIAGNOSIS — Z13.29 THYROID DISORDER SCREEN: ICD-10-CM

## 2025-04-29 DIAGNOSIS — J45.909 UNCOMPLICATED ASTHMA, UNSPECIFIED ASTHMA SEVERITY, UNSPECIFIED WHETHER PERSISTENT: Primary | ICD-10-CM

## 2025-04-29 DIAGNOSIS — Z13.220 ENCOUNTER FOR LIPID SCREENING FOR CARDIOVASCULAR DISEASE: ICD-10-CM

## 2025-04-29 DIAGNOSIS — Z13.6 ENCOUNTER FOR LIPID SCREENING FOR CARDIOVASCULAR DISEASE: ICD-10-CM

## 2025-04-29 DIAGNOSIS — Z00.00 ANNUAL PHYSICAL EXAM: ICD-10-CM

## 2025-04-29 DIAGNOSIS — R53.83 OTHER FATIGUE: ICD-10-CM

## 2025-04-29 DIAGNOSIS — J30.2 SEASONAL ALLERGIC RHINITIS, UNSPECIFIED TRIGGER: ICD-10-CM

## 2025-04-29 LAB
25(OH)D3 SERPL-MCNC: 25 NG/ML (ref 30–100)
ALBUMIN SERPL-MCNC: 4.1 G/DL (ref 3.5–5.2)
ALBUMIN/GLOB SERPL: 1.3 G/DL
ALP SERPL-CCNC: 89 U/L (ref 39–117)
ALT SERPL W P-5'-P-CCNC: 24 U/L (ref 1–33)
ANION GAP SERPL CALCULATED.3IONS-SCNC: 11.5 MMOL/L (ref 5–15)
AST SERPL-CCNC: 19 U/L (ref 1–32)
BASOPHILS # BLD AUTO: 0.08 10*3/MM3 (ref 0–0.2)
BASOPHILS NFR BLD AUTO: 0.6 % (ref 0–1.5)
BILIRUB SERPL-MCNC: 0.6 MG/DL (ref 0–1.2)
BUN SERPL-MCNC: 10 MG/DL (ref 6–20)
BUN/CREAT SERPL: 13.7 (ref 7–25)
CALCIUM SPEC-SCNC: 9 MG/DL (ref 8.6–10.5)
CHLORIDE SERPL-SCNC: 108 MMOL/L (ref 98–107)
CHOLEST SERPL-MCNC: 141 MG/DL (ref 0–200)
CO2 SERPL-SCNC: 24.5 MMOL/L (ref 22–29)
CREAT SERPL-MCNC: 0.73 MG/DL (ref 0.57–1)
DEPRECATED RDW RBC AUTO: 42.7 FL (ref 37–54)
EGFRCR SERPLBLD CKD-EPI 2021: 115.8 ML/MIN/1.73
EOSINOPHIL # BLD AUTO: 0.1 10*3/MM3 (ref 0–0.4)
EOSINOPHIL NFR BLD AUTO: 0.8 % (ref 0.3–6.2)
ERYTHROCYTE [DISTWIDTH] IN BLOOD BY AUTOMATED COUNT: 13.8 % (ref 12.3–15.4)
GLOBULIN UR ELPH-MCNC: 3.1 GM/DL
GLUCOSE SERPL-MCNC: 94 MG/DL (ref 65–99)
HCT VFR BLD AUTO: 41.1 % (ref 34–46.6)
HDLC SERPL-MCNC: 53 MG/DL (ref 40–60)
HGB BLD-MCNC: 13.6 G/DL (ref 12–15.9)
IMM GRANULOCYTES # BLD AUTO: 0.09 10*3/MM3 (ref 0–0.05)
IMM GRANULOCYTES NFR BLD AUTO: 0.7 % (ref 0–0.5)
LDLC SERPL CALC-MCNC: 75 MG/DL (ref 0–100)
LDLC/HDLC SERPL: 1.42 {RATIO}
LYMPHOCYTES # BLD AUTO: 2.94 10*3/MM3 (ref 0.7–3.1)
LYMPHOCYTES NFR BLD AUTO: 23.7 % (ref 19.6–45.3)
MCH RBC QN AUTO: 28.3 PG (ref 26.6–33)
MCHC RBC AUTO-ENTMCNC: 33.1 G/DL (ref 31.5–35.7)
MCV RBC AUTO: 85.4 FL (ref 79–97)
MONOCYTES # BLD AUTO: 0.77 10*3/MM3 (ref 0.1–0.9)
MONOCYTES NFR BLD AUTO: 6.2 % (ref 5–12)
NEUTROPHILS NFR BLD AUTO: 68 % (ref 42.7–76)
NEUTROPHILS NFR BLD AUTO: 8.45 10*3/MM3 (ref 1.7–7)
NRBC BLD AUTO-RTO: 0 /100 WBC (ref 0–0.2)
PLATELET # BLD AUTO: 368 10*3/MM3 (ref 140–450)
PMV BLD AUTO: 9.4 FL (ref 6–12)
POTASSIUM SERPL-SCNC: 3.9 MMOL/L (ref 3.5–5.2)
PROT SERPL-MCNC: 7.2 G/DL (ref 6–8.5)
RBC # BLD AUTO: 4.81 10*6/MM3 (ref 3.77–5.28)
SODIUM SERPL-SCNC: 144 MMOL/L (ref 136–145)
TRIGL SERPL-MCNC: 65 MG/DL (ref 0–150)
TSH SERPL DL<=0.05 MIU/L-ACNC: 3.78 UIU/ML (ref 0.27–4.2)
VIT B12 BLD-MCNC: 446 PG/ML (ref 211–946)
VLDLC SERPL-MCNC: 13 MG/DL (ref 5–40)
WBC NRBC COR # BLD AUTO: 12.43 10*3/MM3 (ref 3.4–10.8)

## 2025-04-29 PROCEDURE — 36415 COLL VENOUS BLD VENIPUNCTURE: CPT

## 2025-04-29 PROCEDURE — 82607 VITAMIN B-12: CPT

## 2025-04-29 PROCEDURE — 82306 VITAMIN D 25 HYDROXY: CPT

## 2025-04-29 PROCEDURE — 80061 LIPID PANEL: CPT

## 2025-04-29 PROCEDURE — 80050 GENERAL HEALTH PANEL: CPT

## 2025-04-29 RX ORDER — FLUTICASONE PROPIONATE AND SALMETEROL XINAFOATE 230; 21 UG/1; UG/1
2 AEROSOL, METERED RESPIRATORY (INHALATION)
Qty: 12 G | Refills: 11 | Status: SHIPPED | OUTPATIENT
Start: 2025-04-29

## 2025-07-26 DIAGNOSIS — F32.0 CURRENT MILD EPISODE OF MAJOR DEPRESSIVE DISORDER, UNSPECIFIED WHETHER RECURRENT: ICD-10-CM

## 2025-07-26 DIAGNOSIS — F41.9 ANXIETY: ICD-10-CM

## 2025-07-28 RX ORDER — DESVENLAFAXINE 25 MG/1
25 TABLET, EXTENDED RELEASE ORAL DAILY
Qty: 90 TABLET | Refills: 1 | Status: SHIPPED | OUTPATIENT
Start: 2025-07-28

## (undated) DEVICE — TRY PREP SCRB VAG PVP

## (undated) DEVICE — CYSTO PACK: Brand: MEDLINE INDUSTRIES, INC.

## (undated) DEVICE — GW ULTRATRACK HYBRID STR/TP .035IN 3X150CM

## (undated) DEVICE — NITINOL STONE RETRIEVAL BASKET: Brand: ESCAPE

## (undated) DEVICE — TOWEL,OR,DSP,ST,BLUE,STD,4/PK,20PK/CS: Brand: MEDLINE

## (undated) DEVICE — BASIC SINGLE BASIN-LF: Brand: MEDLINE INDUSTRIES, INC.

## (undated) DEVICE — SYS IRR PUMP SGL ACTN VAC SYR 10CC

## (undated) DEVICE — ENDOSCOPIC VALVE WITH ADAPTER.: Brand: SURSEAL® II

## (undated) DEVICE — SHEATH ACC URETRL UROPASS 12/14F 24CM

## (undated) DEVICE — CATH URETRL FLXITP POLLACK STD 5F 70CM

## (undated) DEVICE — GLV SURG SENSICARE SLT PF LF 7 STRL

## (undated) DEVICE — DUAL LUMEN URETERAL CATHETER

## (undated) DEVICE — Device

## (undated) DEVICE — SOL IRR NACL 0.9PCT 3000ML

## (undated) DEVICE — Y-TYPE TUR/BLADDER IRRIGATION SET, REGULATING CLAMP

## (undated) DEVICE — FIBR LASR HOLMIUM COMPAT 272MH DISP